# Patient Record
Sex: MALE | Race: WHITE | Employment: FULL TIME | URBAN - NONMETROPOLITAN AREA
[De-identification: names, ages, dates, MRNs, and addresses within clinical notes are randomized per-mention and may not be internally consistent; named-entity substitution may affect disease eponyms.]

---
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
## 2020-01-01 ENCOUNTER — HOSPITAL ENCOUNTER (INPATIENT)
Age: 20
LOS: 1 days | Discharge: HOME | DRG: 153 | End: 2020-01-02
Attending: FAMILY MEDICINE | Admitting: INTERNAL MEDICINE
Payer: COMMERCIAL

## 2020-01-01 ENCOUNTER — HOSPITAL ENCOUNTER (EMERGENCY)
Age: 20
Discharge: ANOTHER HOSPITAL | End: 2020-01-01
Attending: EMERGENCY MEDICINE
Payer: COMMERCIAL

## 2020-01-01 ENCOUNTER — APPOINTMENT (OUTPATIENT)
Dept: CT IMAGING | Age: 20
End: 2020-01-01
Payer: COMMERCIAL

## 2020-01-01 VITALS
OXYGEN SATURATION: 99 % | TEMPERATURE: 98.7 F | WEIGHT: 160 LBS | HEART RATE: 115 BPM | RESPIRATION RATE: 18 BRPM | SYSTOLIC BLOOD PRESSURE: 151 MMHG | DIASTOLIC BLOOD PRESSURE: 95 MMHG

## 2020-01-01 PROBLEM — J36 PERITONSILLAR ABSCESS: Status: ACTIVE | Noted: 2020-01-01

## 2020-01-01 LAB
ABSOLUTE BANDS #: 0.11 K/UL
ABSOLUTE EOS #: 0.11 K/UL (ref 0–0.4)
ABSOLUTE IMMATURE GRANULOCYTE: 0 K/UL (ref 0–0.3)
ABSOLUTE LYMPH #: 2.22 K/UL (ref 1.2–5.2)
ABSOLUTE MONO #: 0.89 K/UL (ref 0.1–1.4)
ANION GAP SERPL CALCULATED.3IONS-SCNC: 16 MMOL/L (ref 9–17)
ATYPICAL LYMPHOCYTE ABSOLUTE COUNT: 0.22 K/UL
ATYPICAL LYMPHOCYTES: 2 %
BANDS: 1 %
BASOPHILS # BLD: 0 % (ref 0–2)
BASOPHILS ABSOLUTE: 0 K/UL (ref 0–0.2)
BUN BLDV-MCNC: 9 MG/DL (ref 6–20)
BUN/CREAT BLD: 13 (ref 9–20)
CALCIUM SERPL-MCNC: 9.9 MG/DL (ref 8.6–10.4)
CHLORIDE BLD-SCNC: 96 MMOL/L (ref 98–107)
CO2: 25 MMOL/L (ref 20–31)
CREAT SERPL-MCNC: 0.72 MG/DL (ref 0.7–1.2)
DIFFERENTIAL TYPE: ABNORMAL
EOSINOPHILS RELATIVE PERCENT: 1 % (ref 1–8)
GFR AFRICAN AMERICAN: ABNORMAL ML/MIN
GFR NON-AFRICAN AMERICAN: ABNORMAL ML/MIN
GFR SERPL CREATININE-BSD FRML MDRD: ABNORMAL ML/MIN/{1.73_M2}
GFR SERPL CREATININE-BSD FRML MDRD: ABNORMAL ML/MIN/{1.73_M2}
GLUCOSE BLD-MCNC: 126 MG/DL (ref 70–99)
HCT VFR BLD CALC: 48.6 % (ref 40.7–50.3)
HEMOGLOBIN: 17 G/DL (ref 13–17)
IMMATURE GRANULOCYTES: 0 %
LYMPHOCYTES # BLD: 20 % (ref 15–43)
MCH RBC QN AUTO: 29.8 PG (ref 25.2–33.5)
MCHC RBC AUTO-ENTMCNC: 35 G/DL (ref 25.2–33.5)
MCV RBC AUTO: 85.1 FL (ref 82.6–102.9)
MONOCYTES # BLD: 8 % (ref 6–14)
MONONUCLEOSIS SCREEN: NEGATIVE
MORPHOLOGY: ABNORMAL
MORPHOLOGY: ABNORMAL
NRBC AUTOMATED: 0 PER 100 WBC
PDW BLD-RTO: 11.9 % (ref 11.8–14.4)
PLATELET # BLD: 343 K/UL (ref 138–453)
PLATELET ESTIMATE: ABNORMAL
PMV BLD AUTO: 10.5 FL (ref 8.1–13.5)
POC STREP A ANTIGEN: NEGATIVE
POTASSIUM SERPL-SCNC: 3.7 MMOL/L (ref 3.7–5.3)
RBC # BLD: 5.71 M/UL (ref 4.21–5.77)
RBC # BLD: ABNORMAL 10*6/UL
SEG NEUTROPHILS: 68 % (ref 44–74)
SEGMENTED NEUTROPHILS ABSOLUTE COUNT: 7.55 K/UL (ref 1.8–8)
SODIUM BLD-SCNC: 137 MMOL/L (ref 135–144)
WBC # BLD: 11.1 K/UL (ref 4.5–13.5)
WBC # BLD: ABNORMAL 10*3/UL

## 2020-01-01 PROCEDURE — 96375 TX/PRO/DX INJ NEW DRUG ADDON: CPT

## 2020-01-01 PROCEDURE — 6360000004 HC RX CONTRAST MEDICATION: Performed by: EMERGENCY MEDICINE

## 2020-01-01 PROCEDURE — 6360000002 HC RX W HCPCS: Performed by: EMERGENCY MEDICINE

## 2020-01-01 PROCEDURE — 96365 THER/PROPH/DIAG IV INF INIT: CPT

## 2020-01-01 PROCEDURE — 1200000000 HC SEMI PRIVATE

## 2020-01-01 PROCEDURE — 2580000003 HC RX 258: Performed by: PHYSICIAN ASSISTANT

## 2020-01-01 PROCEDURE — 2709999900 HC NON-CHARGEABLE SUPPLY

## 2020-01-01 PROCEDURE — 80048 BASIC METABOLIC PNL TOTAL CA: CPT

## 2020-01-01 PROCEDURE — 87651 STREP A DNA AMP PROBE: CPT

## 2020-01-01 PROCEDURE — 99284 EMERGENCY DEPT VISIT MOD MDM: CPT

## 2020-01-01 PROCEDURE — 99223 1ST HOSP IP/OBS HIGH 75: CPT | Performed by: PHYSICIAN ASSISTANT

## 2020-01-01 PROCEDURE — 85025 COMPLETE CBC W/AUTO DIFF WBC: CPT

## 2020-01-01 PROCEDURE — 2709999900 CT SOFT TISSUE NECK W CONTRAST

## 2020-01-01 PROCEDURE — 86308 HETEROPHILE ANTIBODY SCREEN: CPT

## 2020-01-01 PROCEDURE — 36415 COLL VENOUS BLD VENIPUNCTURE: CPT

## 2020-01-01 PROCEDURE — 2500000003 HC RX 250 WO HCPCS: Performed by: EMERGENCY MEDICINE

## 2020-01-01 RX ORDER — POLYETHYLENE GLYCOL 3350 17 G/17G
17 POWDER, FOR SOLUTION ORAL DAILY PRN
Status: DISCONTINUED | OUTPATIENT
Start: 2020-01-01 | End: 2020-01-02 | Stop reason: HOSPADM

## 2020-01-01 RX ORDER — POTASSIUM CHLORIDE 7.45 MG/ML
10 INJECTION INTRAVENOUS PRN
Status: DISCONTINUED | OUTPATIENT
Start: 2020-01-01 | End: 2020-01-02 | Stop reason: HOSPADM

## 2020-01-01 RX ORDER — SODIUM CHLORIDE 0.9 % (FLUSH) 0.9 %
10 SYRINGE (ML) INJECTION EVERY 12 HOURS SCHEDULED
Status: DISCONTINUED | OUTPATIENT
Start: 2020-01-01 | End: 2020-01-02 | Stop reason: HOSPADM

## 2020-01-01 RX ORDER — ONDANSETRON 2 MG/ML
4 INJECTION INTRAMUSCULAR; INTRAVENOUS EVERY 6 HOURS PRN
Status: DISCONTINUED | OUTPATIENT
Start: 2020-01-01 | End: 2020-01-02 | Stop reason: HOSPADM

## 2020-01-01 RX ORDER — CLINDAMYCIN PHOSPHATE 600 MG/50ML
600 INJECTION INTRAVENOUS EVERY 8 HOURS
Status: DISCONTINUED | OUTPATIENT
Start: 2020-01-02 | End: 2020-01-02 | Stop reason: ALTCHOICE

## 2020-01-01 RX ORDER — SODIUM CHLORIDE 9 MG/ML
INJECTION, SOLUTION INTRAVENOUS CONTINUOUS
Status: DISCONTINUED | OUTPATIENT
Start: 2020-01-01 | End: 2020-01-02 | Stop reason: HOSPADM

## 2020-01-01 RX ORDER — SODIUM CHLORIDE 0.9 % (FLUSH) 0.9 %
10 SYRINGE (ML) INJECTION PRN
Status: DISCONTINUED | OUTPATIENT
Start: 2020-01-01 | End: 2020-01-02 | Stop reason: HOSPADM

## 2020-01-01 RX ORDER — DEXAMETHASONE SODIUM PHOSPHATE 4 MG/ML
4 INJECTION, SOLUTION INTRA-ARTICULAR; INTRALESIONAL; INTRAMUSCULAR; INTRAVENOUS; SOFT TISSUE EVERY 12 HOURS
Status: DISCONTINUED | OUTPATIENT
Start: 2020-01-02 | End: 2020-01-02 | Stop reason: ALTCHOICE

## 2020-01-01 RX ORDER — POTASSIUM CHLORIDE 20 MEQ/1
40 TABLET, EXTENDED RELEASE ORAL PRN
Status: DISCONTINUED | OUTPATIENT
Start: 2020-01-01 | End: 2020-01-02 | Stop reason: HOSPADM

## 2020-01-01 RX ORDER — DEXAMETHASONE SODIUM PHOSPHATE 10 MG/ML
10 INJECTION INTRAMUSCULAR; INTRAVENOUS ONCE
Status: COMPLETED | OUTPATIENT
Start: 2020-01-01 | End: 2020-01-01

## 2020-01-01 RX ORDER — ACETAMINOPHEN 325 MG/1
650 TABLET ORAL EVERY 4 HOURS PRN
Status: DISCONTINUED | OUTPATIENT
Start: 2020-01-01 | End: 2020-01-02 | Stop reason: HOSPADM

## 2020-01-01 RX ORDER — CLINDAMYCIN PHOSPHATE 900 MG/50ML
900 INJECTION INTRAVENOUS ONCE
Status: COMPLETED | OUTPATIENT
Start: 2020-01-01 | End: 2020-01-01

## 2020-01-01 RX ADMIN — SODIUM CHLORIDE: 9 INJECTION, SOLUTION INTRAVENOUS at 21:53

## 2020-01-01 RX ADMIN — Medication 10 ML: at 21:53

## 2020-01-01 RX ADMIN — DEXAMETHASONE SODIUM PHOSPHATE 10 MG: 10 INJECTION INTRAMUSCULAR; INTRAVENOUS at 16:43

## 2020-01-01 RX ADMIN — CLINDAMYCIN PHOSPHATE 900 MG: 900 INJECTION, SOLUTION INTRAVENOUS at 16:47

## 2020-01-01 RX ADMIN — IOPAMIDOL 75 ML: 755 INJECTION, SOLUTION INTRAVENOUS at 16:03

## 2020-01-01 ASSESSMENT — PAIN DESCRIPTION - PROGRESSION
CLINICAL_PROGRESSION: GRADUALLY WORSENING
CLINICAL_PROGRESSION: NOT CHANGED

## 2020-01-01 ASSESSMENT — PAIN DESCRIPTION - PAIN TYPE
TYPE: ACUTE PAIN
TYPE: ACUTE PAIN

## 2020-01-01 ASSESSMENT — PAIN DESCRIPTION - FREQUENCY
FREQUENCY: CONTINUOUS
FREQUENCY: INTERMITTENT

## 2020-01-01 ASSESSMENT — PAIN DESCRIPTION - LOCATION
LOCATION: THROAT
LOCATION: THROAT

## 2020-01-01 ASSESSMENT — PAIN DESCRIPTION - DESCRIPTORS: DESCRIPTORS: SORE

## 2020-01-01 ASSESSMENT — PAIN - FUNCTIONAL ASSESSMENT: PAIN_FUNCTIONAL_ASSESSMENT: PREVENTS OR INTERFERES SOME ACTIVE ACTIVITIES AND ADLS

## 2020-01-01 ASSESSMENT — PAIN DESCRIPTION - ONSET
ONSET: ON-GOING
ONSET: ON-GOING

## 2020-01-01 ASSESSMENT — PAIN SCALES - GENERAL
PAINLEVEL_OUTOF10: 8
PAINLEVEL_OUTOF10: 6

## 2020-01-01 ASSESSMENT — PAIN DESCRIPTION - ORIENTATION: ORIENTATION: RIGHT

## 2020-01-01 NOTE — ED PROVIDER NOTES
Psoriasis Father           SOCIAL HISTORY       Social History     Socioeconomic History    Marital status: Single     Spouse name: None    Number of children: None    Years of education: None    Highest education level: None   Occupational History    None   Social Needs    Financial resource strain: None    Food insecurity:     Worry: None     Inability: None    Transportation needs:     Medical: None     Non-medical: None   Tobacco Use    Smoking status: Never Smoker    Smokeless tobacco: Never Used   Substance and Sexual Activity    Alcohol use: No    Drug use: No    Sexual activity: None   Lifestyle    Physical activity:     Days per week: None     Minutes per session: None    Stress: None   Relationships    Social connections:     Talks on phone: None     Gets together: None     Attends Evangelical service: None     Active member of club or organization: None     Attends meetings of clubs or organizations: None     Relationship status: None    Intimate partner violence:     Fear of current or ex partner: None     Emotionally abused: None     Physically abused: None     Forced sexual activity: None   Other Topics Concern    None   Social History Narrative    None       SCREENINGS    Ricardo Coma Scale  Eye Opening: Spontaneous  Best Verbal Response: Oriented  Best Motor Response: Obeys commands  [place] Coma Scale Score: 15        PHYSICAL EXAM    (up to 7 for level 4, 8 or more for level 5)     ED Triage Vitals [01/01/20 1517]   BP Temp Temp src Heart Rate Resp SpO2 Height Weight   (!) 145/93 98.7 °F (37.1 °C) -- 109 14 98 % -- --       Physical Exam  Vitals signs reviewed. Constitutional:       General: He is not in acute distress. Appearance: Normal appearance. He is not ill-appearing. HENT:      Head: Normocephalic.       Right Ear: External ear normal.      Left Ear: External ear normal.      Nose: Nose normal.      Mouth/Throat:      Mouth: Mucous membranes are moist.      Comments: Patient is experiencing mild trismus. He has bilateral tonsillar enlargement with erythema, the right one greater than the left with shift of uvula to the left. No exudate is seen. Eyes:      Extraocular Movements: Extraocular movements intact. Pupils: Pupils are equal, round, and reactive to light. Neck:      Musculoskeletal: Neck supple. Cardiovascular:      Rate and Rhythm: Normal rate and regular rhythm. Pulmonary:      Effort: Pulmonary effort is normal.      Breath sounds: Normal breath sounds. Abdominal:      Palpations: Abdomen is soft. Musculoskeletal: Normal range of motion. Lymphadenopathy:      Cervical: Cervical adenopathy present. Skin:     General: Skin is warm and dry. Coloration: Skin is not pale. Findings: No rash. Neurological:      General: No focal deficit present. Mental Status: He is alert. DIAGNOSTIC RESULTS     EKG: All EKG's are interpreted by the Emergency Department Physician who either signs orCo-signs this chart in the absence of a cardiologist.    RADIOLOGY:   Non-plain film images such as CT, Ultrasound and MRI are read by the radiologist. Plain radiographic images are visualized and preliminarily interpreted by the emergency physician with the below findings:    Interpretation per the Radiologist below, ifavailable at the time of this note:    CT SOFT TISSUE NECK W CONTRAST   Final Result   Acute tonsillitis with subcentimeter right peritonsillar abscess and reactive   bilateral upper cervical adenopathy.                ED BEDSIDE ULTRASOUND:   Performed by ED Physician - none    LABS:  Labs Reviewed   CBC WITH AUTO DIFFERENTIAL - Abnormal; Notable for the following components:       Result Value    MCHC 35.0 (*)     Bands 1 (*)     All other components within normal limits   BASIC METABOLIC PANEL - Abnormal; Notable for the following components:    Glucose 126 (*)     Chloride 96 (*)     All other components within normal limits MONONUCLEOSIS SCREEN   STREP A DNA PROBE, AMPLIFICATION   POCT RAPID STREP A   POC STREP A ANTIGEN       All other labs were within normal range ornot returned as of this dictation. EMERGENCY DEPARTMENT COURSE and DIFFERENTIAL DIAGNOSIS/MDM:   Vitals:    Vitals:    01/01/20 1517 01/01/20 1546 01/01/20 1649   BP: (!) 145/93  (!) 151/95   Pulse: 109  115   Resp: 14  18   Temp: 98.7 °F (37.1 °C)     SpO2: 98%  99%   Weight:  160 lb (72.6 kg)             Patient is afebrile and has a normal white count. Clinically he has signs of a right-sided peritonsillar abscess which is confirmed by his CT scan of soft tissue of the neck showing a subcentimeter abscess. His airway is not compromised at this time. He is started on IV clindamycin and given a single dose of that drawn 10 mg IV. [facility] was contacted and their admitting nurse has accepted the patient for their hospitalist Dr. Rick Harrell. Patient is stable for transfer. MDM    CONSULTS:  None    PROCEDURES:  Unlessotherwise noted below, none     Procedures    FINAL IMPRESSION      1. Peritonsillar abscess          DISPOSITION/PLAN   DISPOSITION Decision To Transfer 01/01/2020 04:47:54 PM      PATIENT REFERRED TO:  No follow-up provider specified. DISCHARGE MEDICATIONS:         Problem List:  There is no problem list on file for this patient. Summation      Patient Course: Transferred. ED Medicationsadministered this visit:    Medications   clindamycin (CLEOCIN) 900 mg in dextrose 5 % 50 mL IVPB (900 mg Intravenous New Bag 1/1/20 1647)   iopamidol (ISOVUE-370) 76 % injection 75 mL (75 mLs Intravenous Given 1/1/20 1603)   dexamethasone (DECADRON) injection 10 mg (10 mg Intravenous Given 1/1/20 1643)       New Prescriptions from this visit:    New Prescriptions    No medications on file       Follow-up:  No follow-up provider specified. Final Impression:   1.  Peritonsillar abscess               (Please note that

## 2020-01-02 VITALS
SYSTOLIC BLOOD PRESSURE: 130 MMHG | DIASTOLIC BLOOD PRESSURE: 68 MMHG | RESPIRATION RATE: 16 BRPM | HEART RATE: 94 BPM | OXYGEN SATURATION: 97 % | TEMPERATURE: 98 F | HEIGHT: 70 IN | BODY MASS INDEX: 22.69 KG/M2 | WEIGHT: 158.5 LBS

## 2020-01-02 PROBLEM — J03.01 ACUTE RECURRENT STREPTOCOCCAL TONSILLITIS: Status: ACTIVE | Noted: 2020-01-02

## 2020-01-02 PROBLEM — D72.89 ATYPICAL LYMPHOCYTES PRESENT ON PERIPHERAL BLOOD SMEAR: Status: ACTIVE | Noted: 2020-01-02

## 2020-01-02 LAB
ANION GAP SERPL CALCULATED.3IONS-SCNC: 15 MEQ/L (ref 8–16)
ATYPICAL LYMPHOCYTES: ABNORMAL %
BASOPHILS # BLD: 0.1 %
BASOPHILS ABSOLUTE: 0 THOU/MM3 (ref 0–0.1)
BUN BLDV-MCNC: 12 MG/DL (ref 7–22)
CALCIUM SERPL-MCNC: 9.8 MG/DL (ref 8.5–10.5)
CHLORIDE BLD-SCNC: 99 MEQ/L (ref 98–111)
CO2: 24 MEQ/L (ref 23–33)
CREAT SERPL-MCNC: 0.6 MG/DL (ref 0.4–1.2)
EOSINOPHIL # BLD: 0 %
EOSINOPHILS ABSOLUTE: 0 THOU/MM3 (ref 0–0.4)
ERYTHROCYTE [DISTWIDTH] IN BLOOD BY AUTOMATED COUNT: 11.9 % (ref 11.5–14.5)
ERYTHROCYTE [DISTWIDTH] IN BLOOD BY AUTOMATED COUNT: 38.2 FL (ref 35–45)
GLUCOSE BLD-MCNC: 126 MG/DL (ref 70–108)
HCT VFR BLD CALC: 48 % (ref 42–52)
HEMOGLOBIN: 16.4 GM/DL (ref 14–18)
IMMATURE GRANS (ABS): 0.08 THOU/MM3 (ref 0–0.07)
IMMATURE GRANULOCYTES: 0.8 %
LYMPHOCYTES # BLD: 11.9 %
LYMPHOCYTES ABSOLUTE: 1.2 THOU/MM3 (ref 1–4.8)
MCH RBC QN AUTO: 29.9 PG (ref 26–33)
MCHC RBC AUTO-ENTMCNC: 34.2 GM/DL (ref 32.2–35.5)
MCV RBC AUTO: 87.6 FL (ref 80–94)
MONOCYTES # BLD: 1.7 %
MONOCYTES ABSOLUTE: 0.2 THOU/MM3 (ref 0.4–1.3)
NUCLEATED RED BLOOD CELLS: 0 /100 WBC
PLATELET # BLD: 320 THOU/MM3 (ref 130–400)
PMV BLD AUTO: 10.9 FL (ref 9.4–12.4)
POTASSIUM REFLEX MAGNESIUM: 4.5 MEQ/L (ref 3.5–5.2)
RBC # BLD: 5.48 MILL/MM3 (ref 4.7–6.1)
SCAN OF BLOOD SMEAR: NORMAL
SEG NEUTROPHILS: 85.5 %
SEGMENTED NEUTROPHILS ABSOLUTE COUNT: 8.8 THOU/MM3 (ref 1.8–7.7)
SODIUM BLD-SCNC: 138 MEQ/L (ref 135–145)
WBC # BLD: 10.3 THOU/MM3 (ref 4.8–10.8)

## 2020-01-02 PROCEDURE — 2500000003 HC RX 250 WO HCPCS: Performed by: PHYSICIAN ASSISTANT

## 2020-01-02 PROCEDURE — 80048 BASIC METABOLIC PNL TOTAL CA: CPT

## 2020-01-02 PROCEDURE — 94761 N-INVAS EAR/PLS OXIMETRY MLT: CPT

## 2020-01-02 PROCEDURE — 2709999900 HC NON-CHARGEABLE SUPPLY

## 2020-01-02 PROCEDURE — 6360000002 HC RX W HCPCS: Performed by: OTOLARYNGOLOGY

## 2020-01-02 PROCEDURE — 2580000003 HC RX 258: Performed by: OTOLARYNGOLOGY

## 2020-01-02 PROCEDURE — 36415 COLL VENOUS BLD VENIPUNCTURE: CPT

## 2020-01-02 PROCEDURE — 85025 COMPLETE CBC W/AUTO DIFF WBC: CPT

## 2020-01-02 PROCEDURE — 99252 IP/OBS CONSLTJ NEW/EST SF 35: CPT | Performed by: PHYSICIAN ASSISTANT

## 2020-01-02 PROCEDURE — 99238 HOSP IP/OBS DSCHRG MGMT 30/<: CPT | Performed by: NURSE PRACTITIONER

## 2020-01-02 RX ORDER — DEXAMETHASONE SODIUM PHOSPHATE 4 MG/ML
8 INJECTION, SOLUTION INTRA-ARTICULAR; INTRALESIONAL; INTRAMUSCULAR; INTRAVENOUS; SOFT TISSUE EVERY 12 HOURS
Status: DISCONTINUED | OUTPATIENT
Start: 2020-01-02 | End: 2020-01-02 | Stop reason: HOSPADM

## 2020-01-02 RX ORDER — PREDNISONE 20 MG/1
40 TABLET ORAL DAILY
Qty: 10 TABLET | Refills: 0 | Status: SHIPPED | OUTPATIENT
Start: 2020-01-02 | End: 2020-01-07

## 2020-01-02 RX ORDER — AMOXICILLIN AND CLAVULANATE POTASSIUM 875; 125 MG/1; MG/1
1 TABLET, FILM COATED ORAL 2 TIMES DAILY
Qty: 28 TABLET | Refills: 0 | Status: SHIPPED | OUTPATIENT
Start: 2020-01-02 | End: 2020-01-16

## 2020-01-02 RX ORDER — DEXAMETHASONE SODIUM PHOSPHATE 4 MG/ML
4 INJECTION, SOLUTION INTRA-ARTICULAR; INTRALESIONAL; INTRAMUSCULAR; INTRAVENOUS; SOFT TISSUE EVERY 12 HOURS
Status: DISCONTINUED | OUTPATIENT
Start: 2020-01-02 | End: 2020-01-02

## 2020-01-02 RX ADMIN — AMPICILLIN SODIUM AND SULBACTAM SODIUM 3 G: 2; 1 INJECTION, POWDER, FOR SOLUTION INTRAMUSCULAR; INTRAVENOUS at 04:31

## 2020-01-02 RX ADMIN — CLINDAMYCIN PHOSPHATE 600 MG: 600 INJECTION, SOLUTION INTRAVENOUS at 01:44

## 2020-01-02 RX ADMIN — AMPICILLIN SODIUM AND SULBACTAM SODIUM 3 G: 2; 1 INJECTION, POWDER, FOR SOLUTION INTRAMUSCULAR; INTRAVENOUS at 10:14

## 2020-01-02 RX ADMIN — DEXAMETHASONE SODIUM PHOSPHATE 8 MG: 4 INJECTION, SOLUTION INTRAMUSCULAR; INTRAVENOUS at 06:26

## 2020-01-02 ASSESSMENT — PAIN SCALES - GENERAL: PAINLEVEL_OUTOF10: 2

## 2020-01-02 ASSESSMENT — PAIN DESCRIPTION - PROGRESSION
CLINICAL_PROGRESSION: NOT CHANGED

## 2020-01-02 ASSESSMENT — ENCOUNTER SYMPTOMS
ALLERGIC/IMMUNOLOGIC NEGATIVE: 1
SORE THROAT: 1
RESPIRATORY NEGATIVE: 1
GASTROINTESTINAL NEGATIVE: 1
EYES NEGATIVE: 1

## 2020-01-02 ASSESSMENT — PAIN DESCRIPTION - DESCRIPTORS: DESCRIPTORS: SORE

## 2020-01-02 NOTE — PLAN OF CARE
Problem: Pain:  Goal: Pain level will decrease  Description  Pain level will decrease  Outcome: Ongoing  Note:   Patient reporting pain 3/10 with goal of 4/10. Patient satisfied with current interventions including rest and repositioning. Pain assessments ongoing. Problem: Falls - Risk of:  Goal: Will remain free from falls  Description  Will remain free from falls  Outcome: Ongoing  Note:   Patient free from falls this shift. Patient using call light appropriately. Call light in reach, fall sign posted, nonskid footwear on, hourly rounding, bed alarm on, bed rails up x2. Patient at risk for falls due to generalized weakness. Problem: Physical Regulation:  Goal: Will remain free from infection  Description  Will remain free from infection  Outcome: Ongoing  Note:   WBC 11.1. Patient afebrile and denies chills. Patient on IV abx. Problem: Physical Regulation:  Goal: Ability to maintain vital signs within normal range will improve  Description  Ability to maintain vital signs within normal range will improve  Outcome: Ongoing  Note:   VSS. Problem: Discharge Planning:  Goal: Discharged to appropriate level of care  Description  Discharged to appropriate level of care  Outcome: Ongoing  Note:   Discharge plans to home with dad discussed with patient. Care plan reviewed with patient. Patient verbalizes understanding of the plan of care and contributes to goal setting.

## 2020-01-02 NOTE — DISCHARGE SUMMARY
dexamethasone was continued. ENT was consulted for their advice and they found the patient to not have a peritonsillar abscess but rather patchy hypodense areas bilaterally with nothing to drain. At the time of discharge, the patient appears well and reports feeling much better than when he initially presented. He states that the swelling in his throat has gone down and he is able to swallow now. The patient successfully ate a mechanical soft diet for breakfast, was alert and oriented to person, place, time, and situation. He denied any chest pain, shortness of breath, nausea, vomiting, diarrhea, constipation, lightheadedness, dizziness, or any other physical complaints. He was updated on the plan of care, instructed to take the antibiotics and steroids as prescribed throughout the duration of the prescription, and to follow-up with his primary care physician in 1 week. The patient had no other needs or questions at this time. Physical Exam:-  Vitals:   Patient Vitals for the past 24 hrs:   BP Temp Temp src Pulse Resp SpO2 Height Weight   01/02/20 0910 -- -- -- -- -- 97 % -- --   01/02/20 0845 130/68 98 °F (36.7 °C) Oral 94 16 97 % -- --   01/02/20 0430 118/61 98 °F (36.7 °C) Oral 100 16 97 % -- --   01/02/20 0030 121/69 98.4 °F (36.9 °C) Oral 107 16 97 % -- --   01/01/20 2138 131/76 99.5 °F (37.5 °C) Oral 122 16 98 % 5' 10\" (1.778 m) 158 lb 8 oz (71.9 kg)     Weight:   Weight: 158 lb 8 oz (71.9 kg)   24 hour intake/output:     Intake/Output Summary (Last 24 hours) at 1/2/2020 1403  Last data filed at 1/2/2020 0857  Gross per 24 hour   Intake 1226 ml   Output 0 ml   Net 1226 ml       1. General appearance: No apparent distress, appears stated age and cooperative. 2. HEENT: Normal cephalic, atraumatic without obvious deformity. Pupils equal, round, and reactive to light. Extra ocular muscles intact. Conjunctivae/corneas clear.   Mild erythema noted to the anterior tonsillar pillar and posterior pharynx. 3. Neck: Supple, with full range of motion. No jugular venous distention. Trachea midline. 4. Respiratory:  Normal respiratory effort. Clear to auscultation, bilaterally without Rales/Wheezes/Rhonchi. 5. Cardiovascular: Regular rate and rhythm with normal S1/S2 without murmurs, rubs or gallops. 6. Abdomen: Soft, non-tender, non-distended with normal bowel sounds. 7. Musculoskeletal:  No clubbing, cyanosis or edema bilaterally. 8. Skin: Skin color, texture, turgor normal.  No rashes or lesions. 9. Neurologic:  Neurovascularly intact without any focal sensory/motor deficits. Cranial nerves: II-XII intact, grossly non-focal.  10. Psychiatric: Alert and oriented, thought content appropriate, normal insight  11. Capillary Refill: Brisk,< 3 seconds   12. Peripheral Pulses: +2 palpable, equal bilaterally    Discharge Medications:-      Medication List      You have not been prescribed any medications.           Labs :-  Recent Results (from the past 72 hour(s))   CBC Auto Differential    Collection Time: 01/01/20  3:37 PM   Result Value Ref Range    WBC 11.1 4.5 - 13.5 k/uL    RBC 5.71 4.21 - 5.77 m/uL    Hemoglobin 17.0 13.0 - 17.0 g/dL    Hematocrit 48.6 40.7 - 50.3 %    MCV 85.1 82.6 - 102.9 fL    MCH 29.8 25.2 - 33.5 pg    MCHC 35.0 (H) 25.2 - 33.5 g/dL    RDW 11.9 11.8 - 14.4 %    Platelets 763 677 - 048 k/uL    MPV 10.5 8.1 - 13.5 fL    NRBC Automated 0.0 0.0 per 100 WBC    Differential Type NOT REPORTED     WBC Morphology NOT REPORTED     RBC Morphology NOT REPORTED     Platelet Estimate NOT REPORTED     Monocytes 8 6 - 14 %    Lymphocytes 20 15 - 43 %    Seg Neutrophils 68 44 - 74 %    Eosinophils % 1 1 - 8 %    Basophils 0 0 - 2 %    Immature Granulocytes 0 0 %    Atypical Lymphocytes 2 %    Bands 1 (H) 0 %    Absolute Mono # 0.89 0.1 - 1.4 k/uL    Absolute Lymph # 2.22 1.2 - 5.2 k/uL    Segs Absolute 7.55 1.8 - 8.0 k/uL    Absolute Eos # 0.11 0.0 - 0.4 k/uL    Basophils Absolute 0.00 0.0 - 0.2 k/uL APRN - CNP on 1/2/2020 at 2:03 PM  Discharging Hospitalist

## 2020-01-02 NOTE — CARE COORDINATION
1/2/20, 10:54 AM  DISCHARGE PLANNING EVALUATION:    Ashleigh Madera       Admitted from: ER, patient presented with a sore throat, recently treated for Strep throat. 1/1/2020/ 220 Adrianna Diaz day: 1   Location: [facility]/016-A Reason for admit: Peritonsillar abscess [J36] Status: Inpt. Admit order signed?: yes  PMH:  has a past medical history of Eczema. Procedure: N/A  Pertinent abnormal Imaging:CT of neck soft tissue:   Acute tonsillitis with subcentimeter right peritonsillar abscess and reactive   bilateral upper cervical adenopathy. Medications:  Scheduled Meds:   dexamethasone  8 mg Intravenous Q12H    ampicillin-sulbactam  3 g Intravenous Q8H    sodium chloride flush  10 mL Intravenous 2 times per day     Continuous Infusions:   sodium chloride 75 mL/hr at 01/01/20 9703      Pertinent Info/Orders/Treatment Plan:  ENT consult, IV fluids, Unasyn, Dexamethasone, prn  Tylenol, Potassium and Magnesium replacement protocol, SCD's, up with assistance. Diet: DIET CLEAR LIQUID;   Smoking status:  reports that he has never smoked. He has never used smokeless tobacco.   PCP: Tiburcio Bhatia MD  Readmission 30 days or less: No  Readmission Risk Score: 6%    Discharge Planning Evaluation  Current Residence:  Private Residence  Living Arrangements:  Parent   Support Systems:  Parent, Family Members  Current Services PTA:     Potential Assistance Needed:  N/A  Potential Assistance Purchasing Medications:  No  Does patient want to participate in local refill/ meds to beds program?  No  Type of Home Care Services:  None  Patient expects to be discharged to:  home  Expected Discharge date:  01/06/20  Follow Up Appointment: Best Day/ Time: Monday AM    Patient Goals/Plan/Treatment Preferences: Met with Alina Campbell. He resides at home with his parents. He has insurance, a PCP, can afford his medications, will have transportation to home at discharge, and his nutritional needs and ADL's are met.  Alina Campbell denies a need for services or DME at discharge. Transportation/Food Security/Housekeeping Addressed:  No issues identified.     Evaluation: no

## 2020-01-02 NOTE — H&P
club or organization: Not on file     Attends meetings of clubs or organizations: Not on file     Relationship status: Not on file    Intimate partner violence:     Fear of current or ex partner: Not on file     Emotionally abused: Not on file     Physically abused: Not on file     Forced sexual activity: Not on file   Other Topics Concern    Not on file   Social History Narrative    Not on file       FHX:   Family History   Problem Relation Age of Onset    Psoriasis Father        Allergies: No Known Allergies    Medications:     sodium chloride        sodium chloride flush  10 mL Intravenous 2 times per day     sodium chloride flush, 10 mL, PRN  ondansetron, 4 mg, Q6H PRN  potassium chloride, 40 mEq, PRN    Or  potassium alternative oral replacement, 40 mEq, PRN    Or  potassium chloride, 10 mEq, PRN  magnesium sulfate, 1 g, PRN  polyethylene glycol, 17 g, Daily PRN  acetaminophen, 650 mg, Q4H PRN      No current facility-administered medications on file prior to encounter. No current outpatient medications on file prior to encounter.          Labs:   Recent Results (from the past 24 hour(s))   CBC Auto Differential    Collection Time: 01/01/20  3:37 PM   Result Value Ref Range    WBC 11.1 4.5 - 13.5 k/uL    RBC 5.71 4.21 - 5.77 m/uL    Hemoglobin 17.0 13.0 - 17.0 g/dL    Hematocrit 48.6 40.7 - 50.3 %    MCV 85.1 82.6 - 102.9 fL    MCH 29.8 25.2 - 33.5 pg    MCHC 35.0 (H) 25.2 - 33.5 g/dL    RDW 11.9 11.8 - 14.4 %    Platelets 907 484 - 966 k/uL    MPV 10.5 8.1 - 13.5 fL    NRBC Automated 0.0 0.0 per 100 WBC    Differential Type NOT REPORTED     WBC Morphology NOT REPORTED     RBC Morphology NOT REPORTED     Platelet Estimate NOT REPORTED     Monocytes 8 6 - 14 %    Lymphocytes 20 15 - 43 %    Seg Neutrophils 68 44 - 74 %    Eosinophils % 1 1 - 8 %    Basophils 0 0 - 2 %    Immature Granulocytes 0 0 %    Atypical Lymphocytes 2 %    Bands 1 (H) 0 %    Absolute Mono # 0.89 0.1 - 1.4 k/uL    Absolute Lymph # 2. 22 1.2 - 5.2 k/uL    Segs Absolute 7.55 1.8 - 8.0 k/uL    Absolute Eos # 0.11 0.0 - 0.4 k/uL    Basophils Absolute 0.00 0.0 - 0.2 k/uL    Absolute Immature Granulocyte 0.00 0.00 - 0.30 k/uL    Atypical Lymphocytes Absolute 0.22 k/uL    Absolute Bands # 0.11 k/uL    Morphology RBC morphology normal.     Morphology Platelet count adequate    Basic Metabolic Prof    Collection Time: 01/01/20  3:37 PM   Result Value Ref Range    Glucose 126 (H) 70 - 99 mg/dL    BUN 9 6 - 20 mg/dL    CREATININE 0.72 0.70 - 1.20 mg/dL    Bun/Cre Ratio 13 9 - 20    Calcium 9.9 8.6 - 10.4 mg/dL    Sodium 137 135 - 144 mmol/L    Potassium 3.7 3.7 - 5.3 mmol/L    Chloride 96 (L) 98 - 107 mmol/L    CO2 25 20 - 31 mmol/L    Anion Gap 16 9 - 17 mmol/L    GFR Non-African American  >60 mL/min     Pediatric GFR requires additional information. Refer to [facility] website for calculator. GFR  NOT REPORTED >60 mL/min    GFR Comment          GFR Staging NOT REPORTED    Mononucleosis Screen    Collection Time: 01/01/20  3:37 PM   Result Value Ref Range    Mononucleosis Screen NEGATIVE NEGATIVE   POC Strep A Antigen    Collection Time: 01/01/20  4:31 PM   Result Value Ref Range    POC Strep A Antigen NEGATIVE NEGATIVE         Vital Signs: T: 99.5F P: 122 RR: 16 B/P: 131/76: FiO2: RA: O2 Sat:98%: I/O: No intake or output data in the 24 hours ending 01/02/20 0039      General:   Well appearing, no acute distress  HEENT:  normocephalic and atraumatic. No scleral icterus. PEARLA, mucous membranes moist, tolerating secreation  Neck: supple. Trachea midline. No JVD. Full ROM, no meningismus. Lungs: clear to auscultation. No retractions, no accessory muscle use. Cardiac:  Tachycardic but regular rhythm, no murmur, 2+ pulses  Abdomen: soft. Nontender. Bowel sounds active  Extremities:  No clubbing, cyanosis x 4, no edema    Vasculature: capillary refill < 3 seconds. Skin:  warm and dry. no visible rashes  Psych:  Alert and oriented x3. Affect appropriate  Lymph:  No supraclavicular adenopathy. Neurologic:  CN II-XII grossly intact. No focal deficit. Data: (All radiographs, tracings, PFTs, and imaging are personally viewed and interpreted unless otherwise noted).     Outside data reviewed        Electronically signed by  Leonel Phoenix PA-C

## 2020-01-02 NOTE — PROGRESS NOTES
Pt admitted to  5K16 from home from direct admit: [facility]. Complaints: sore throat. INT antecubital right, condition patent and no redness and left, condition patent and no redness  IV site free of s/s of infection or infiltration. Vital signs obtained. Assessment and data collection initiated. Two nurse skin assessment performed by Konnie Ormond RN and Paresh Page RN. Oriented to room. Policies and procedures for 5 explained. All questions answered with no further questions at this time. Fall prevention and safety brochure discussed with patient. Bed alarm on. Call light in reach. Oriented to room.    Mony Hobson RN 1/1/2020 9:49 PM

## 2020-01-02 NOTE — CONSULTS
Department of Otolaryngology  Consult Note    Reason for Consult:  Sore throat, possible peritonsillar abscess  Requesting Physician:  Isiah Gallardo PA-C    CHIEF COMPLAINT:  Sore throat    History Obtained From:  patient, electronic medical record    HISTORY OF PRESENT ILLNESS:                The patient is a 23 y.o. male who presented to [facility] as a direct transfer from [facility]. The patient reports that about 2 weeks ago he had a sore throat and was diagnosed with Strep. He was sent home with Amoxicillin which improved the pain. About 2-3 days ago the pain started again and slowly worsened. He went to [facility] ED and was evaluated. The patient was having difficulty opening his mouth at that time. They did a CT which revealed a \"subcentimeter right peritonsillar abscess. \" He was transferred to [facility] for further management in case the abscess required surgical intervention. Presently, the patient reports he feels significantly better. He feels like he can swallow solids and liquids normally now. He states that last few days he hasn't felt like eating at all and now he is becoming hungry. He states that pain has also improved. He denies fevers, chills, shortness of breath, trismus, drooling. The patient was afebrile overnight. No other concerns at this time. Past Medical History:        Diagnosis Date    Eczema        Past Surgical History:    History reviewed. No pertinent surgical history.     Current Medications:   Current Facility-Administered Medications: Dexamethasone Sodium Phosphate injection 8 mg, 8 mg, Intravenous, Q12H  ampicillin-sulbactam (UNASYN) 3 g ivpb minibag, 3 g, Intravenous, Q8H  sodium chloride flush 0.9 % injection 10 mL, 10 mL, Intravenous, 2 times per day  sodium chloride flush 0.9 % injection 10 mL, 10 mL, Intravenous, PRN  ondansetron (ZOFRAN) injection 4 mg, 4 mg, Intravenous, Q6H PRN  0.9 % sodium chloride infusion, , Intravenous, Continuous  potassium chloride (KLOR-CON M) External nose: Appears midline. No obvious deformity or masses. Septum:  deviated. No septal hematoma. No perforation. Mucosa:  inflamed  Turbinates: red and congested            Discharge:  none    Mouth/Throat:  Lips, tongue and oral cavity: Normal. No masses or lesions noted. Bilateral angles of the mandible are palpable. Dentition: good, no malocclusion  Oral mucosa: moist  Tonsils: Tonsils mildly erythematous. Asymmetrical with Right >Left, but both tonsils are 2+. Non-obstructive appearing. No exudates noted. Oropharynx: normal-appearing mucosa  Hard and soft palates: symmetrical and intact. Salivary glands: not enlarged and no tenderness to palpation. Uvula: Midline, no deviation present. Gag reflex is present    Neck: Trachea midline. Thyroid not enlarged, no palpable masses or tenderness. Lymphatic: Bilateral cervical nodes palpable with R slightly worse than left. Eyes: KERRY, EOM intact. Conjunctiva moist without discharge. Lungs: Normal effort of breathing, not obviously distressed. Neuro: Cranial nerves II-XII grossly intact. Extremities: No clubbing, edema, or cyanosis noted.     DATA:    Component Value Ref Range & Units Status Collected Lab   WBC 10.3  4.8 - 10.8 thou/mm3 Final 01/02/2020  7:25 AM [facility]   RBC 5.48  4.70 - 6.10 mill/mm3 Final 01/02/2020  7:25 AM [facility]   Hemoglobin 16.4  14.0 - 18.0 gm/dl Final 01/02/2020  7:25 AM [facility]   Hematocrit 48.0  42.0 - 52.0 % Final 01/02/2020  7:25 AM [facility]   MCV 87.6  80.0 - 94.0 fL Final 01/02/2020  7:25 AM [facility]   MCH 29.9  26.0 - 33.0 pg Final 01/02/2020  7:25 AM [facility]   MCHC 34.2  32.2 - 35.5 gm/dl Final 01/02/2020  7:25 AM [facility] - [facility]   RDW-CV 11.9  11.5 - 14.5 % Final 01/02/2020  7:25 AM [facility]   RDW-SD 38.2  35.0 - 45.0 fL Final 01/02/2020  7:25 AM [facility] - [facility]   Platelets 519  973 - 947 thou/mm3 Final 01/02/2020  7:25 AM  [facility]   MPV 10.9  9.4 - 12.4 fL Final 01/02/2020  7:25 AM [facility]   Seg Neutrophils 85.5  % Final 01/02/2020  7:25 AM [facility]   Lymphocytes 11.9  % Final 01/02/2020  7:25 AM  [facility]   Monocytes 1.7  % Final 01/02/2020  7:25 AM  - [facility]   Eosinophils 0.0  % Final 01/02/2020  7:25 AM [facility]   Basophils 0.1  % Final 01/02/2020  7:25 AM  [facility]   Immature Granulocytes 0.8  % Final 01/02/2020  7:25 AM  [facility]   Atypical Lymphocytes RARE  % Final 01/02/2020  7:25 AM  [facility]   Segs Absolute 8. 8High   1.8 - 7.7 thou/mm3 Final 01/02/2020  7:25 AM  [facility]   Lymphocytes Absolute 1.2  1.0 - 4.8 thou/mm3 Final 01/02/2020  7:25 AM  [facility]   Monocytes Absolute 0.2Low   0.4 - 1.3 thou/mm3 Final 01/02/2020  7:25 AM  [facility]   Eosinophils Absolute 0.0  0.0 - 0.4 thou/mm3 Final 01/02/2020  7:25 AM  - [facility]   Basophils Absolute 0.0  0.0 - 0.1 thou/mm3 Final 01/02/2020  7:25 AM  - [facility]   Immature Grans (Abs) 0. 08High   0.00 - 0.07 thou/mm3 Final 01/02/2020  7:25 AM  - [facility]   nRBC 0  /100 wbc Final 01/02/2020  7:25 AM  - [facility] [address] [facility]         Radiology Review:  CT Soft tissue Neck W Contrast 1/1/19     FINDINGS:   PHARYNX/LARYNX:  The palatine tonsils are enlarged, right greater than left. There is focal subcentimeter right peritonsillar rim enhancing hypodensity.    The tongue is normal in appearance.  The valleculae, epiglottis,   aryepiglottic folds and pyriform sinuses appear unremarkable.  The true and   false vocal cords are normal in appearance.  No mass is seen.       SALIVARY GLANDS/THYROID:  The parotid and submandibular glands are   unremarkable.  There are incidental sub 5 mm bilateral thyroid nodules, which   are likely benign and for which no lymphadenopathy  -likely secondary to #1  -Will monitor for resolution at outpatient follow up appointment.      Electronically signed by KIKI Marquez on 1/2/2020 at 1:47 PM

## 2020-01-02 NOTE — PROGRESS NOTES
All discharge instructions given to patient and family with no further questions at this time. Patient discharged off unit via wheelchair. Chart contents placed in yellow bin.    Electronically signed by Rod Jackson RN on 1/2/2020 at 4:53 PM

## 2020-01-03 LAB
DIRECT EXAM: NORMAL
Lab: NORMAL
SPECIMEN DESCRIPTION: NORMAL

## 2020-01-03 NOTE — CARE COORDINATION
Late entry, patient discharged to home last evening with no services added/requested. 1/3/20, 7:13 AM    Patient goals/plan/ treatment preferences discussed by  and . Patient goals/plan/ treatment preferences reviewed with patient/ family. Patient/ family verbalize understanding of discharge plan and are in agreement with goal/plan/treatment preferences. Understanding was demonstrated using the teach back method. AVS provided by RN at time of discharge, which includes all necessary medical information pertaining to the patients current course of illness, treatment, post-discharge goals of care, and treatment preferences.

## 2020-01-10 ENCOUNTER — NURSE TRIAGE (OUTPATIENT)
Dept: OTHER | Facility: CLINIC | Age: 20
End: 2020-01-10

## 2020-01-11 NOTE — TELEPHONE ENCOUNTER
Patient is calling because he started antibiotics last week and he is having loose stool approx 5-7 bowel movements a day. Patient stated he is drinking plenty of fluids. In addition, his buttocks is bleeding when he wipes. Denies any blood in stool. The blood is on toilet tissue when he wipes. Please do not respond to the triage nurse through this encounter. Any subsequent communication should be directly with the patient.       Reason for Disposition   Normal antibiotic diarrhea    Protocols used: DIARRHEA ON ANTIBIOTICS-PEDIATRIC-

## 2020-01-11 NOTE — TELEPHONE ENCOUNTER
Please check with pt on Monday morning. If persisting, pt needs stool culture, including C Diff. Let me know.  ES

## 2020-01-13 NOTE — TELEPHONE ENCOUNTER
Spoke to the pt and he stated that he has been taking medication to stop the diarrhea (didn't know what it was called) and isn't really bothered by it anymore. Pt had no symptoms today. Pt declined stool studies.

## 2020-01-21 ENCOUNTER — OFFICE VISIT (OUTPATIENT)
Dept: ENT CLINIC | Age: 20
End: 2020-01-21
Payer: COMMERCIAL

## 2020-01-21 VITALS
DIASTOLIC BLOOD PRESSURE: 72 MMHG | RESPIRATION RATE: 16 BRPM | HEIGHT: 71 IN | HEART RATE: 78 BPM | SYSTOLIC BLOOD PRESSURE: 116 MMHG | WEIGHT: 162.6 LBS | BODY MASS INDEX: 22.76 KG/M2

## 2020-01-21 PROCEDURE — G8420 CALC BMI NORM PARAMETERS: HCPCS | Performed by: OTOLARYNGOLOGY

## 2020-01-21 PROCEDURE — G8484 FLU IMMUNIZE NO ADMIN: HCPCS | Performed by: OTOLARYNGOLOGY

## 2020-01-21 PROCEDURE — 99213 OFFICE O/P EST LOW 20 MIN: CPT | Performed by: OTOLARYNGOLOGY

## 2020-01-21 PROCEDURE — 1036F TOBACCO NON-USER: CPT | Performed by: OTOLARYNGOLOGY

## 2020-01-21 PROCEDURE — G8427 DOCREV CUR MEDS BY ELIG CLIN: HCPCS | Performed by: OTOLARYNGOLOGY

## 2020-01-21 PROCEDURE — 1111F DSCHRG MED/CURRENT MED MERGE: CPT | Performed by: OTOLARYNGOLOGY

## 2020-01-21 ASSESSMENT — ENCOUNTER SYMPTOMS
FACIAL SWELLING: 0
APNEA: 0
SORE THROAT: 0
SINUS PRESSURE: 0
ABDOMINAL PAIN: 0
DIARRHEA: 0
SHORTNESS OF BREATH: 0
CHEST TIGHTNESS: 0
CHOKING: 0
WHEEZING: 0
RHINORRHEA: 0
STRIDOR: 0
COLOR CHANGE: 0
VOMITING: 0
COUGH: 0
TROUBLE SWALLOWING: 0
NAUSEA: 0
VOICE CHANGE: 0

## 2020-01-21 NOTE — PROGRESS NOTES
[facility] PROFESSIONAL [facility] EAR, NOSE AND THROAT  Migdalia Wong [id number] [facility]  Dept: [phone]  Dept Fax: [phone]  Loc: [phone]    Shreya Ashford is a 23 y.o. male who was referred byNo ref. provider found for:  Chief Complaint   Patient presents with    Follow-up     Patient here for follow up from hospital   .    HPI:     Shreya Ashford is a 23 y.o. male who presents today for his hospital stay with severe tonsillitis. There is one area that was possibly a subcentimeter abscess but responded to antibiotics and steroids and did not develop into a full-blown abscess at all. Suspect it may have just been phlegmon. It would have been impossible to find and drain. Tim Carter History:     No Known Allergies  No current outpatient medications on file. No current facility-administered medications for this visit. Past Medical History:   Diagnosis Date    Eczema       History reviewed. No pertinent surgical history. Family History   Problem Relation Age of Onset    Psoriasis Father      Social History     Tobacco Use    Smoking status: Never Smoker    Smokeless tobacco: Never Used   Substance Use Topics    Alcohol use: No       Subjective:      Review of Systems   Constitutional: Negative for activity change, appetite change, chills, diaphoresis, fatigue, fever and unexpected weight change. HENT: Negative for congestion, dental problem, ear discharge, ear pain, facial swelling, hearing loss, mouth sores, nosebleeds, postnasal drip, rhinorrhea, sinus pressure, sneezing, sore throat, tinnitus, trouble swallowing and voice change. Eyes: Negative for visual disturbance. Respiratory: Negative for apnea, cough, choking, chest tightness, shortness of breath, wheezing and stridor. Cardiovascular: Negative for chest pain, palpitations and leg swelling. Gastrointestinal: Negative for abdominal pain, diarrhea, nausea and vomiting.    Endocrine: Negative for cold intolerance, heat intolerance, polydipsia and polyuria. Genitourinary: Negative for dysuria, enuresis and hematuria. Musculoskeletal: Negative for arthralgias, gait problem, neck pain and neck stiffness. Skin: Negative for color change and rash. Allergic/Immunologic: Negative for environmental allergies, food allergies and immunocompromised state. Neurological: Negative for dizziness, syncope, facial asymmetry, speech difficulty, light-headedness and headaches. Hematological: Negative for adenopathy. Does not bruise/bleed easily. Psychiatric/Behavioral: Negative for confusion and sleep disturbance. The patient is not nervous/anxious. Objective:   /72 (Site: Left Upper Arm, Position: Sitting)   Pulse 78   Resp 16   Ht 5' 11\" (1.803 m)   Wt 162 lb 9.6 oz (73.8 kg)   BMI 22.68 kg/m²     Physical Exam   2+ symmetric tonsils. No erythema    Data:  All of the past medical history, past surgical history, family history,social history, allergies and current medications were reviewed with the patient. Assessment & Plan   Diagnoses and all orders for this visit:     Diagnosis Orders   1. Acute recurrent streptococcal tonsillitis     2. Tonsillar abscess         The findings were explained and his questions were answered. Patient is reassured. He has another severe episode perhaps a tonsillectomy might be indicated. Return if symptoms worsen or fail to improve. Nehemiah Varghese. Sha Thornton MD    **This report has been created using voice recognition software. It may contain minor errors which are inherent in voicerecognition technology. **

## 2020-02-03 ENCOUNTER — HOSPITAL ENCOUNTER (EMERGENCY)
Age: 20
Discharge: HOME | End: 2020-02-04
Attending: EMERGENCY MEDICINE
Payer: COMMERCIAL

## 2020-02-03 ENCOUNTER — APPOINTMENT (OUTPATIENT)
Dept: CT IMAGING | Age: 20
End: 2020-02-03
Payer: COMMERCIAL

## 2020-02-03 ENCOUNTER — TELEPHONE (OUTPATIENT)
Dept: ENT CLINIC | Age: 20
End: 2020-02-03

## 2020-02-03 VITALS
DIASTOLIC BLOOD PRESSURE: 76 MMHG | WEIGHT: 160 LBS | TEMPERATURE: 99.5 F | HEIGHT: 71 IN | RESPIRATION RATE: 18 BRPM | OXYGEN SATURATION: 97 % | SYSTOLIC BLOOD PRESSURE: 129 MMHG | HEART RATE: 95 BPM | BODY MASS INDEX: 22.4 KG/M2

## 2020-02-03 LAB
ANION GAP SERPL CALCULATED.3IONS-SCNC: 17 MEQ/L (ref 8–16)
BASOPHILS # BLD: 0.3 %
BASOPHILS ABSOLUTE: 0 THOU/MM3 (ref 0–0.1)
BUN BLDV-MCNC: 8 MG/DL (ref 7–22)
CALCIUM SERPL-MCNC: 10 MG/DL (ref 8.5–10.5)
CHLORIDE BLD-SCNC: 98 MEQ/L (ref 98–111)
CO2: 25 MEQ/L (ref 23–33)
CREAT SERPL-MCNC: 0.6 MG/DL (ref 0.4–1.2)
EOSINOPHIL # BLD: 0.7 %
EOSINOPHILS ABSOLUTE: 0.1 THOU/MM3 (ref 0–0.4)
ERYTHROCYTE [DISTWIDTH] IN BLOOD BY AUTOMATED COUNT: 12.6 % (ref 11.5–14.5)
ERYTHROCYTE [DISTWIDTH] IN BLOOD BY AUTOMATED COUNT: 39.2 FL (ref 35–45)
GLUCOSE BLD-MCNC: 88 MG/DL (ref 70–108)
HCT VFR BLD CALC: 46.6 % (ref 42–52)
HEMOGLOBIN: 16.1 GM/DL (ref 14–18)
IMMATURE GRANS (ABS): 0.07 THOU/MM3 (ref 0–0.07)
IMMATURE GRANULOCYTES: 0.6 %
LYMPHOCYTES # BLD: 18.3 %
LYMPHOCYTES ABSOLUTE: 2.2 THOU/MM3 (ref 1–4.8)
MCH RBC QN AUTO: 29.9 PG (ref 26–33)
MCHC RBC AUTO-ENTMCNC: 34.5 GM/DL (ref 32.2–35.5)
MCV RBC AUTO: 86.5 FL (ref 80–94)
MONOCYTES # BLD: 9 %
MONOCYTES ABSOLUTE: 1.1 THOU/MM3 (ref 0.4–1.3)
NUCLEATED RED BLOOD CELLS: 0 /100 WBC
OSMOLALITY CALCULATION: 277.1 MOSMOL/KG (ref 275–300)
PLATELET # BLD: 288 THOU/MM3 (ref 130–400)
PMV BLD AUTO: 10.8 FL (ref 9.4–12.4)
POTASSIUM SERPL-SCNC: 3.9 MEQ/L (ref 3.5–5.2)
RBC # BLD: 5.39 MILL/MM3 (ref 4.7–6.1)
SEG NEUTROPHILS: 71.1 %
SEGMENTED NEUTROPHILS ABSOLUTE COUNT: 8.7 THOU/MM3 (ref 1.8–7.7)
SODIUM BLD-SCNC: 140 MEQ/L (ref 135–145)
WBC # BLD: 12.2 THOU/MM3 (ref 4.8–10.8)

## 2020-02-03 PROCEDURE — 80048 BASIC METABOLIC PNL TOTAL CA: CPT

## 2020-02-03 PROCEDURE — 99283 EMERGENCY DEPT VISIT LOW MDM: CPT

## 2020-02-03 PROCEDURE — 70491 CT SOFT TISSUE NECK W/DYE: CPT

## 2020-02-03 PROCEDURE — 96365 THER/PROPH/DIAG IV INF INIT: CPT

## 2020-02-03 PROCEDURE — 2580000003 HC RX 258: Performed by: EMERGENCY MEDICINE

## 2020-02-03 PROCEDURE — 85025 COMPLETE CBC W/AUTO DIFF WBC: CPT

## 2020-02-03 PROCEDURE — 96375 TX/PRO/DX INJ NEW DRUG ADDON: CPT

## 2020-02-03 PROCEDURE — 36415 COLL VENOUS BLD VENIPUNCTURE: CPT

## 2020-02-03 PROCEDURE — 6360000002 HC RX W HCPCS: Performed by: EMERGENCY MEDICINE

## 2020-02-03 PROCEDURE — 6360000004 HC RX CONTRAST MEDICATION: Performed by: EMERGENCY MEDICINE

## 2020-02-03 RX ORDER — DEXAMETHASONE SODIUM PHOSPHATE 4 MG/ML
10 INJECTION, SOLUTION INTRA-ARTICULAR; INTRALESIONAL; INTRAMUSCULAR; INTRAVENOUS; SOFT TISSUE ONCE
Status: COMPLETED | OUTPATIENT
Start: 2020-02-03 | End: 2020-02-03

## 2020-02-03 RX ORDER — PREDNISONE 20 MG/1
20 TABLET ORAL 2 TIMES DAILY
Qty: 10 TABLET | Refills: 0 | Status: SHIPPED | OUTPATIENT
Start: 2020-02-03 | End: 2020-02-03

## 2020-02-03 RX ORDER — CEFDINIR 300 MG/1
300 CAPSULE ORAL 2 TIMES DAILY
Qty: 20 CAPSULE | Refills: 0 | Status: SHIPPED | OUTPATIENT
Start: 2020-02-03 | End: 2020-02-03

## 2020-02-03 RX ORDER — KETOROLAC TROMETHAMINE 30 MG/ML
15 INJECTION, SOLUTION INTRAMUSCULAR; INTRAVENOUS ONCE
Status: COMPLETED | OUTPATIENT
Start: 2020-02-03 | End: 2020-02-03

## 2020-02-03 RX ORDER — SODIUM CHLORIDE 9 MG/ML
1000 INJECTION, SOLUTION INTRAVENOUS CONTINUOUS
Status: DISCONTINUED | OUTPATIENT
Start: 2020-02-03 | End: 2020-02-04 | Stop reason: HOSPADM

## 2020-02-03 RX ADMIN — SODIUM CHLORIDE 1000 ML: 9 INJECTION, SOLUTION INTRAVENOUS at 21:14

## 2020-02-03 RX ADMIN — IOPAMIDOL 80 ML: 755 INJECTION, SOLUTION INTRAVENOUS at 21:53

## 2020-02-03 RX ADMIN — CEFTRIAXONE SODIUM 1 G: 1 INJECTION, POWDER, FOR SOLUTION INTRAMUSCULAR; INTRAVENOUS at 21:15

## 2020-02-03 RX ADMIN — DEXAMETHASONE SODIUM PHOSPHATE 10 MG: 4 INJECTION, SOLUTION INTRA-ARTICULAR; INTRALESIONAL; INTRAMUSCULAR; INTRAVENOUS; SOFT TISSUE at 21:14

## 2020-02-03 RX ADMIN — KETOROLAC TROMETHAMINE 15 MG: 30 INJECTION, SOLUTION INTRAMUSCULAR at 22:59

## 2020-02-03 ASSESSMENT — PAIN DESCRIPTION - DESCRIPTORS: DESCRIPTORS: DISCOMFORT;TIGHTNESS

## 2020-02-03 ASSESSMENT — PAIN SCALES - GENERAL
PAINLEVEL_OUTOF10: 5
PAINLEVEL_OUTOF10: 5
PAINLEVEL_OUTOF10: 3
PAINLEVEL_OUTOF10: 6

## 2020-02-03 ASSESSMENT — PAIN DESCRIPTION - PAIN TYPE: TYPE: ACUTE PAIN

## 2020-02-03 ASSESSMENT — ENCOUNTER SYMPTOMS
NAUSEA: 0
TROUBLE SWALLOWING: 0
COUGH: 0
SHORTNESS OF BREATH: 0
VOMITING: 0
RHINORRHEA: 0

## 2020-02-03 ASSESSMENT — PAIN DESCRIPTION - ORIENTATION: ORIENTATION: RIGHT

## 2020-02-03 NOTE — ED TRIAGE NOTES
Pt to er. Pt c/o abscess rt side of neck. States was seen here last month and admitted for same thing. Pt states hard to swallow and hurts. States unsure if he has had fevers or not. Pt states would like to wait to see doctor before any tests are ordered.  States trying to keep down on cost.

## 2020-02-03 NOTE — TELEPHONE ENCOUNTER
Patient called and stated that he is concerned that he has another abscess. He stated that he has a sore throat on the right side with ear pain. He stated that he was on Augmentin for 2 weeks completed it about 1.5 weeks ago. Asked patient if he can see anything in the back of the throat and he stated that he cant really open his mouth that it causes him pain. He stated that he has trouble swallowing. Asked patient if he has noticed any trouble breathing and denied any trouble breathing. Patient is scheduled with Dr. Marjorie Harper on Thursday.

## 2020-02-04 ENCOUNTER — OFFICE VISIT (OUTPATIENT)
Dept: ENT CLINIC | Age: 20
End: 2020-02-04
Payer: COMMERCIAL

## 2020-02-04 VITALS
SYSTOLIC BLOOD PRESSURE: 122 MMHG | BODY MASS INDEX: 22.72 KG/M2 | DIASTOLIC BLOOD PRESSURE: 70 MMHG | HEART RATE: 76 BPM | RESPIRATION RATE: 12 BRPM | WEIGHT: 162.9 LBS

## 2020-02-04 PROCEDURE — G8427 DOCREV CUR MEDS BY ELIG CLIN: HCPCS | Performed by: OTOLARYNGOLOGY

## 2020-02-04 PROCEDURE — 99213 OFFICE O/P EST LOW 20 MIN: CPT | Performed by: OTOLARYNGOLOGY

## 2020-02-04 PROCEDURE — 1036F TOBACCO NON-USER: CPT | Performed by: OTOLARYNGOLOGY

## 2020-02-04 PROCEDURE — G8420 CALC BMI NORM PARAMETERS: HCPCS | Performed by: OTOLARYNGOLOGY

## 2020-02-04 PROCEDURE — G8484 FLU IMMUNIZE NO ADMIN: HCPCS | Performed by: OTOLARYNGOLOGY

## 2020-02-04 RX ORDER — AMOXICILLIN AND CLAVULANATE POTASSIUM 875; 125 MG/1; MG/1
1 TABLET, FILM COATED ORAL 2 TIMES DAILY
Qty: 20 TABLET | Refills: 0 | Status: SHIPPED | OUTPATIENT
Start: 2020-02-04 | End: 2020-02-14

## 2020-02-04 RX ORDER — LACTOBACILLUS RHAMNOSUS GG 10B CELL
1 CAPSULE ORAL DAILY
Qty: 20 CAPSULE | Refills: 0 | Status: SHIPPED | OUTPATIENT
Start: 2020-02-04 | End: 2020-09-16 | Stop reason: ALTCHOICE

## 2020-02-04 ASSESSMENT — ENCOUNTER SYMPTOMS
ABDOMINAL PAIN: 0
VOICE CHANGE: 0
DIARRHEA: 0
COLOR CHANGE: 0
VOMITING: 0
FACIAL SWELLING: 0
RHINORRHEA: 0
SINUS PRESSURE: 0
NAUSEA: 0
WHEEZING: 0
CHEST TIGHTNESS: 0
APNEA: 0
VOICE CHANGE: 0
COUGH: 0
STRIDOR: 0
CHEST TIGHTNESS: 0
SORE THROAT: 1
TROUBLE SWALLOWING: 1
SHORTNESS OF BREATH: 0
CHOKING: 0
SORE THROAT: 1

## 2020-02-04 NOTE — ED NOTES
Pt brought back to room 15 from intake for assessment. Dr Adelaida Cruz to bedside for pt assessment.       Arneta Schwab, RN  02/03/20 0257

## 2020-02-04 NOTE — ED PROVIDER NOTES
problem, drooling, rhinorrhea, trouble swallowing and voice change. Admits painful swallowing   Respiratory: Negative for cough, chest tightness and shortness of breath. Cardiovascular: Negative for chest pain. Gastrointestinal: Negative for nausea and vomiting. Musculoskeletal: Negative for arthralgias and myalgias. Skin: Negative for rash. Hematological: Negative for adenopathy. Does not bruise/bleed easily. All other systems reviewed and are negative. PAST MEDICAL HISTORY    has a past medical history of Eczema. SURGICAL HISTORY    has no past surgical history on file. CURRENT MEDICATIONS       Previous Medications    No medications on file       ALLERGIES     has No Known Allergies. FAMILY HISTORY     He indicated that the status of his father is unknown.   family history includes Psoriasis in his father. SOCIAL HISTORY      reports that he has never smoked. He has never used smokeless tobacco. He reports that he does not drink alcohol or use drugs. PHYSICAL EXAM     INITIAL VITALS:  height is 5' 11\" (1.803 m) and weight is 160 lb (72.6 kg). His oral temperature is 99.5 °F (37.5 °C). His blood pressure is 129/76 and his pulse is 95. His respiration is 18 and oxygen saturation is 97%. Physical Exam  Vitals signs and nursing note reviewed. Constitutional:       Appearance: He is well-developed. He is not diaphoretic. HENT:      Head: Normocephalic and atraumatic. Jaw: No trismus or tenderness. Comments: No signs of trismus. Right Ear: External ear normal.      Left Ear: External ear normal.      Mouth/Throat:      Mouth: Mucous membranes are moist.      Tongue: Tongue does not protrude in midline. Pharynx: Uvula midline. Pharyngeal swelling and posterior oropharyngeal erythema present. No oropharyngeal exudate or uvula swelling. Tonsils: Tonsillar exudate present. No tonsillar abscesses. Swellin+ on the right. 4+ on the left. Comments: Symmetric bilateral tonsillar hypertrophy. Uvula is midline  Posterior pharnyx clear without exudate. Symmetric swelling  No tongue elevation. No Cellulitis   No neck swelling  No neck crepitus  Eyes:      General: No scleral icterus. Right eye: No discharge. Left eye: No discharge. Conjunctiva/sclera: Conjunctivae normal.   Neck:      Musculoskeletal: Normal range of motion and neck supple. No neck rigidity or muscular tenderness. Vascular: No JVD. Pulmonary:      Effort: Pulmonary effort is normal. No respiratory distress. Breath sounds: Normal breath sounds. No stridor, decreased air movement or transmitted upper airway sounds. No decreased breath sounds, wheezing, rhonchi or rales. Comments: No stridor. Non tripod positioning. No signs of respiratory distress. Abdominal:      General: There is no distension. Palpations: Abdomen is soft. Musculoskeletal: Normal range of motion. Lymphadenopathy:      Cervical: Cervical adenopathy present. Skin:     General: Skin is warm and dry. Capillary Refill: Capillary refill takes less than 2 seconds. Findings: No erythema. Neurological:      Mental Status: He is alert and oriented to person, place, and time. Cranial Nerves: No cranial nerve deficit. Motor: No abnormal muscle tone.    Psychiatric:         Behavior: Behavior normal.         DIFFERENTIAL DIAGNOSIS:   Phlegmon, tonsilitis, PTA, Strep pharyngitis, Mono, Unlikely RPA, Ludwigs angina, Lemierre's syndrome or epiglottitis    DIAGNOSTIC RESULTS     EKG: All EKG's are interpreted by the Emergency Department Physician who either signs or Co-signs this chart in the absence of a cardiologist.  EKG interpreted by Sadie Araya, DO:    None    RADIOLOGY: non-plain film images(s) such as CT, Ultrasound and MRI are read by the radiologist.    CT Soft Tissue Neck W Contrast   Final Result   Findings consistent with acute tonsillitis, more severe on the right, without discrete abscess. Probable right tonsillar phlegmon. Thickened edematous uvula. Bilateral cervical and right submandibular region probably reactive adenopathy. Edema around the right carotid artery and jugular vein extending inferiorly adjacent to the right submandibular gland. **This report has been created using voice recognition software. It may contain minor errors which are inherent in voice recognition technology. **      Final report electronically signed by Dr. Damaso Fisher on 2/3/2020 10:20 PM           LABS:   Labs Reviewed   CBC WITH AUTO DIFFERENTIAL - Abnormal; Notable for the following components:       Result Value    WBC 12.2 (*)     Segs Absolute 8.7 (*)     All other components within normal limits   ANION GAP - Abnormal; Notable for the following components:    Anion Gap 17.0 (*)     All other components within normal limits   BASIC METABOLIC PANEL   OSMOLALITY   GROUP A STREP, REFLEX       EMERGENCY DEPARTMENT COURSE:   Vitals:    Vitals:    02/03/20 2211 02/03/20 2259 02/03/20 2339 02/03/20 2340   BP: (!) 150/84  129/76    Pulse: 100 119 102 95   Resp: 19 19 18    Temp:       TempSrc:       SpO2: 100% 100% 97%    Weight:       Height:           8:55 PM: The patient was seen and evaluated. Patient progressively improved during his ED stay. He reports he felt better after Abx and Decadron. He was able to swallow, eat and drink without difficulty. No signs of airway issues. MDM:  I estimate there is LOW risk for GIOVANY'S ANGINA, PERITONSILLAR ABSCESS, SEPSIS, RETROPHARYNGEAL ABSCESS, LEMIERRE's, OR EPIGLOTTITIS thus I consider the discharge disposition reasonable. Patient improved during ED stay. He did not appear toxic or septic. His HR was elevated initially likely from Pain and his prolonged wait in the waiting room as this readily improved. WBC count elevated slightly at 12 I don't believe this is of clinically significance.  I discussed case mis-transcribed.)    Scribe:  Anna Cyr 2/3/20 8:55 PM Scribing for and in the presence of Reno Shi DO. Signed by: Kamala Hooks, 02/03/20 8:55 PM    Provider:  I personally performed the services described in the documentation, reviewed and edited the documentation which was dictated to the scribe in my presence, and it accurately records my words and actions.     Reno Shi DO 2/3/20 8:55 PM     Reno Shi DO  02/04/20 0630

## 2020-02-04 NOTE — ED NOTES
Bedside report received at this time from [facility]. Will monitor.      Macy Morfin RN  02/03/20 5975

## 2020-02-04 NOTE — ED PROVIDER NOTES
[facility] Triage Note-Initiation of of Medical Screening Exam      Shellie Xiao is a 23 y.o. male who presents to the Emergency Department with complaint of right neck swelling. The patient has a history of recurrent peritonsillar abscesses. C/O right neck swelling, fullness, trismus. Brief Exam  Flushed, warm to the touch. Right sided neck swelling is noted, cervical and submandibular lymphadenopathy. Trismus. Interventions:  Labs and CT scan ordered. Moved to higher level of care. The patient was initially triaged and workup initiated by Jelly Villegas Please see provider dictation for full History and Physical for further details of the patient's visit today.             Jelly Villegas, YISSEL - KATARINA  02/03/20 2161

## 2020-02-04 NOTE — ED NOTES
ED nurse-to-nurse bedside report    Chief Complaint   Patient presents with    Neck Swelling     ABSCESS last month      LOC: alert and orientated to name, place, date  Vital signs   Vitals:    02/03/20 2041 02/03/20 2042 02/03/20 2113 02/03/20 2211   BP:  (!) 163/107 (!) 144/84 (!) 150/84   Pulse: 114  107 100   Resp: 19 19 19   Temp:       TempSrc:       SpO2: 100%  100% 100%   Weight:       Height:          Pain:    Pain Interventions: Toradol  Pain Goal: 3  Oxygen: No    Current needs required Follow up with Dr Howie Espinal regarding CT    Telemetry: No  LDAs:   Peripheral IV 02/03/20 Right Hand (Active)   Site Assessment Clean;Dry; Intact 2/3/2020  8:46 PM   Line Status Flushed;Normal saline locked 2/3/2020  8:46 PM   Dressing Status Clean; Intact;Dry 2/3/2020  8:46 PM     Continuous Infusions:    sodium chloride Stopped (02/03/20 2210)     Mobility: Independent  Kiser Fall Risk Score: No flowsheet data found.   Fall Interventions: N/A  Report given to: Parisa Sands RN  02/03/20 7847

## 2020-02-05 ENCOUNTER — TELEPHONE (OUTPATIENT)
Dept: FAMILY MEDICINE CLINIC | Age: 20
End: 2020-02-05

## 2020-02-06 ENCOUNTER — OFFICE VISIT (OUTPATIENT)
Dept: ENT CLINIC | Age: 20
End: 2020-02-06
Payer: COMMERCIAL

## 2020-02-06 VITALS
SYSTOLIC BLOOD PRESSURE: 106 MMHG | HEIGHT: 71 IN | DIASTOLIC BLOOD PRESSURE: 70 MMHG | RESPIRATION RATE: 12 BRPM | BODY MASS INDEX: 23.46 KG/M2 | WEIGHT: 167.6 LBS | HEART RATE: 70 BPM

## 2020-02-06 PROCEDURE — G8427 DOCREV CUR MEDS BY ELIG CLIN: HCPCS | Performed by: PHYSICIAN ASSISTANT

## 2020-02-06 PROCEDURE — G8484 FLU IMMUNIZE NO ADMIN: HCPCS | Performed by: PHYSICIAN ASSISTANT

## 2020-02-06 PROCEDURE — G8420 CALC BMI NORM PARAMETERS: HCPCS | Performed by: PHYSICIAN ASSISTANT

## 2020-02-06 PROCEDURE — 1036F TOBACCO NON-USER: CPT | Performed by: PHYSICIAN ASSISTANT

## 2020-02-06 PROCEDURE — 99212 OFFICE O/P EST SF 10 MIN: CPT | Performed by: PHYSICIAN ASSISTANT

## 2020-02-06 ASSESSMENT — ENCOUNTER SYMPTOMS
CHOKING: 0
FACIAL SWELLING: 0
SINUS PRESSURE: 0
APNEA: 0
VOICE CHANGE: 0
ABDOMINAL PAIN: 0
DIARRHEA: 0
TROUBLE SWALLOWING: 0
SHORTNESS OF BREATH: 0
COUGH: 0
WHEEZING: 0
RHINORRHEA: 0
VOMITING: 0
STRIDOR: 0
SORE THROAT: 1
COLOR CHANGE: 0
CHEST TIGHTNESS: 0
NAUSEA: 0

## 2020-02-06 NOTE — PROGRESS NOTES
[facility] PROFESSIONAL [facility] EAR, NOSE AND THROAT  [address] [facility]  Dept: [phone]  Dept Fax: [phone]  Loc: [phone]    Ted Salazar is a 23 y.o. male who was referred by No ref. provider found for:  Chief Complaint   Patient presents with    Follow-up     Patient is here for a follow up. Vernell Faust HPI:     The patient is here for follow up on right tonsillar swelling. The patient was seen by Dr. Jose Luis Gibbs for follow up on  2/4/20 after hospitalization for severe tonsillitis, with possible phlegmon. The patient called the office the day before (2/3/20) stating that he was concerned he may be developing another abscess since the swelling was worsening on the right again. Recommended he go to the ER for evaluation. New CT of neck revealed acute tonsillitis with probable right tonsillar phlegmon. The patient then followed up with Dr. Jose Luis Gibbs who put him on Augmentin and recommended short follow up. I saw the patient during the visit on 2/4/20 so I could see the swelling at that time and monitor for changes during the follow up today. The patient reports he feels like the pain and swelling has slightly improved. He has had 5 doses of the Augmentin. He reports that opening his mouth continues to feel just slightly limited, although he reports mostly due to pain. He denies drooling, shortness of breath, fevers, chills. The patient reports that he has been getting strep 1-2 times a year for the many years. No other concerns at this time. Subjective:        Review of Systems   Constitutional: Negative for activity change, appetite change, chills, diaphoresis, fatigue, fever and unexpected weight change. HENT: Positive for sore throat.  Negative for congestion, dental problem, ear discharge, ear pain, facial swelling, hearing loss, mouth sores, nosebleeds, postnasal drip, rhinorrhea, sinus pressure, sneezing, tinnitus, trouble swallowing and voice carotid artery and jugular vein extending inferiorly adjacent to the right submandibular gland. There is no discrete abscess. There is no evidence of airway compromise. Retropharyngeal soft tissues are normal.   The cervical and upper thoracic esophagus are unremarkable. There is bilateral cervical adenopathy,, more severe on the right and likely reactive. There is also right submandibular region adenopathy.       Salivary glands: The salivary glands are normal.       Thyroid: unremarkable.       Vasculature: There is no hemodynamically significant stenosis or occlusion in the imaged carotid and vertebral arteries.       Lungs: The imaged lung apices are clear.       Mediastinum: The imaged structures are unremarkable.       Sinuses: The imaged sinuses are clear.       Mastoids: The imaged mastoid air cells are clear.       Musculoskeletal: There is reversal of normal cervical lordosis.       Intracranial contents: Imaged portions unremarkable       Intraorbital contents: Imaged portions unremarkable               Impression   Findings consistent with acute tonsillitis, more severe on the right, without discrete abscess. Probable right tonsillar phlegmon. Thickened edematous uvula. Bilateral cervical and right submandibular region probably reactive adenopathy. Edema around the right carotid artery and jugular vein extending inferiorly adjacent to the right submandibular gland. Assessment/Plan:     Diagnosis Orders   1. Acute recurrent streptococcal tonsillitis     2. Tonsillar abscess         The patient is a 23 y.o. male that presents for follow up on tonsillitis. The patient's right tonsils shows moderate improvement in swelling in just 5 doses of Augmentin. I recommended the patient continue the current antibiotic regimen. He will return after he completes the course of antibiotics with Dr. Alyssa Tesfaye for follow up and to discuss possible T&A.  I gave the patient strict instructions to return to the ER if the swelling worsens, develops shortness of breath, fevers, chills, drooling. He expresses understanding and thanked me. He will contact the office sooner with new or worsening symptoms.      Electronically signed by KIKI Westbrook on 2/6/2020 at 4:27 PM

## 2020-02-27 ENCOUNTER — OFFICE VISIT (OUTPATIENT)
Dept: ENT CLINIC | Age: 20
End: 2020-02-27
Payer: COMMERCIAL

## 2020-02-27 VITALS
WEIGHT: 166.5 LBS | RESPIRATION RATE: 10 BRPM | BODY MASS INDEX: 23.22 KG/M2 | SYSTOLIC BLOOD PRESSURE: 110 MMHG | HEART RATE: 80 BPM | DIASTOLIC BLOOD PRESSURE: 68 MMHG

## 2020-02-27 PROCEDURE — G8427 DOCREV CUR MEDS BY ELIG CLIN: HCPCS | Performed by: OTOLARYNGOLOGY

## 2020-02-27 PROCEDURE — 99213 OFFICE O/P EST LOW 20 MIN: CPT | Performed by: OTOLARYNGOLOGY

## 2020-02-27 PROCEDURE — 1036F TOBACCO NON-USER: CPT | Performed by: OTOLARYNGOLOGY

## 2020-02-27 PROCEDURE — G8420 CALC BMI NORM PARAMETERS: HCPCS | Performed by: OTOLARYNGOLOGY

## 2020-02-27 PROCEDURE — G8484 FLU IMMUNIZE NO ADMIN: HCPCS | Performed by: OTOLARYNGOLOGY

## 2020-02-27 ASSESSMENT — ENCOUNTER SYMPTOMS
SINUS PRESSURE: 0
ABDOMINAL PAIN: 0
TROUBLE SWALLOWING: 0
APNEA: 0
STRIDOR: 0
VOICE CHANGE: 0
WHEEZING: 0
NAUSEA: 0
FACIAL SWELLING: 0
SORE THROAT: 0
VOMITING: 0
RHINORRHEA: 0
SHORTNESS OF BREATH: 0
COUGH: 0
COLOR CHANGE: 0
CHOKING: 0
DIARRHEA: 0
CHEST TIGHTNESS: 0

## 2020-02-27 NOTE — PROGRESS NOTES
[facility] PROFESSIONAL [facility] EAR, NOSE AND THROAT  Migdalia Wong [address] [facility]  Dept: [phone]  Dept Fax: [phone]  Loc: [phone]    Miguelina Boston is a 23 y.o. male who was referred byNo ref. provider found for:  Chief Complaint   Patient presents with    Follow-up     patient is here for 3 week follow up   . HPI:     Miguelina Boston is a 23 y.o. male who presents today for evaluation for tonsillectomy and adenoidectomy because of his severe episodes of tonsillitis. He continues to have large tonsils despite weeks of antibiotic therapy. He requests that his tonsils be removed    History:     No Known Allergies  Current Outpatient Medications   Medication Sig Dispense Refill    lactobacillus (CULTURELLE) capsule Take 1 capsule by mouth daily 20 capsule 0     No current facility-administered medications for this visit. Past Medical History:   Diagnosis Date    Eczema       History reviewed. No pertinent surgical history. Family History   Problem Relation Age of Onset    Psoriasis Father      Social History     Tobacco Use    Smoking status: Never Smoker    Smokeless tobacco: Never Used   Substance Use Topics    Alcohol use: No       Subjective:      Review of Systems   Constitutional: Negative for activity change, appetite change, chills, diaphoresis, fatigue, fever and unexpected weight change. HENT: Negative for congestion, dental problem, ear discharge, ear pain, facial swelling, hearing loss, mouth sores, nosebleeds, postnasal drip, rhinorrhea, sinus pressure, sneezing, sore throat, tinnitus, trouble swallowing and voice change. Eyes: Negative for visual disturbance. Respiratory: Negative for apnea, cough, choking, chest tightness, shortness of breath, wheezing and stridor. Cardiovascular: Negative for chest pain, palpitations and leg swelling. Gastrointestinal: Negative for abdominal pain, diarrhea, nausea and vomiting. Conjugate gaze   Neck:      Musculoskeletal: Neck supple. Thyroid: No thyroid mass or thyromegaly. Trachea: Phonation normal. No tracheal deviation. Comments:   Salivary glands not enlarged and normal to palpation    Pulmonary:      Effort: Pulmonary effort is normal. No retractions. Breath sounds: No stridor. Neurological:      Mental Status: He is alert and oriented to person, place, and time. Cranial Nerves: No cranial nerve deficit (VIIth N function intact bilat). Psychiatric:         Mood and Affect: Mood and affect normal.         Behavior: Behavior is cooperative. Data:  All of the past medical history, past surgical history, family history,social history, allergies and current medications were reviewed with the patient. Assessment & Plan   Diagnoses and all orders for this visit:     Diagnosis Orders   1. Acute recurrent streptococcal tonsillitis     2. Tonsillar abscess     3. Tonsillar hypertrophy, unilateral     4. Chronic tonsillitis         The findings were explained and his questions were answered. Patient certainly qualifies for tonsillectomy, and in fact it may be the only way we get his tonsillitis under control. Decision will be made about adenoidectomy at the time based on the appearance and size of the adenoids. It was recommended that the patient undergo a tonsillectomy and probable adenoidectomy. The risks and benefits were discussed with the patient, including: bleeding, infection, change in voice, velopharyngeal insufficiency. The patient's questions regarding surgery were discussed in detail and their concerns were addressed. No guarantees were made. The patient requests we proceed. The  is not available this date so we will set things up over the telephone. Diamond Barragan. Priya Root MD    **This report has been created using voice recognition software.  It may contain minor errors which are inherent in voicerecognition technology. **

## 2020-03-09 ENCOUNTER — TELEPHONE (OUTPATIENT)
Dept: ENT CLINIC | Age: 20
End: 2020-03-09

## 2020-03-09 NOTE — TELEPHONE ENCOUNTER
Patient was seen on 2/27 and needs scheduled for SX (T&A?). I spoke to patient at . [address] and he wanted to possibly schedule for 4/20. Dr. James Arevalo note and orders still not complete. I tried calling patient to see if he would still like this date, and  is not set up so unable to leave a message. Dr. Adriana Back, please put orders in so patient can be scheduled.

## 2020-03-09 NOTE — TELEPHONE ENCOUNTER
Patient saw my call, and called me right back. He stated that he has to go to [facility] for work so more than likely he will want to schedule in May. He will call me back once his boss gives him the exact dates.

## 2020-03-17 NOTE — TELEPHONE ENCOUNTER
Received orders 3/16; however, holding off on scheduling due to hold of surgeries at this time (COVID-19).

## 2020-07-13 ENCOUNTER — TELEPHONE (OUTPATIENT)
Dept: ENT CLINIC | Age: 20
End: 2020-07-13

## 2020-08-28 ENCOUNTER — TELEPHONE (OUTPATIENT)
Dept: ENT CLINIC | Age: 20
End: 2020-08-28

## 2020-08-28 NOTE — TELEPHONE ENCOUNTER
Attempted to call patient to inform him that surgery is now 09/14/2020 and his pre-op is 09/08/2020 @9:00. Voicemail box is full and unable to leave message.

## 2020-08-28 NOTE — TELEPHONE ENCOUNTER
Patient called wanting to reschedule surgery from 9/28/20 to 9/14/20. Phone number [phone]. Patient would prefer 9/14/20.

## 2020-09-16 NOTE — PROGRESS NOTES
NPO after midnight except sip of water with heart/BP meds  Follow all instructions given by surgeon including medications to hold  Bring insurance card and photo ID  Shower the night before or morning of procedure with liquid antibacterial soap  Wear comfortable clothing  Do not bring jewelry or valuables  Bring list of medications with dosage and how often taken if not reviewed   needed at discharge at least 25years old  Call PAT at [phone] for questions    Instructed to call surgery center at [phone] upon arrival to speak with  before entering building. Covid screen due  at [facility] 6 to 7 days before procedure. Pt plans to have completed on 9/22/2020 at [facility]. In preparation for their surgical procedure above patient was screened for Obstructive Sleep Apnea (BEATRICE) using the STOP-Bang Questionnaire by the Pre-Admission Testing department. This is a pre-surgical screening tool for patient safety and serves as a recommendation, this WILL NOT cause cancellation of surgery. STOP-Bang Questionnaire  * Do you currently see a pulmonologist?  No       1. Do you snore loudly (able to be heard in the next room)? No    2. Do you often feel tired or sleepy during the daytime? No       3. Has anyone ever told you that you stop breathing during your sleep? No    4. Do you have or are you being treated for high blood pressure? No      5. BMI more than 35? BMI (Calculated): 21        No    6. Age over [address] years? 21 y.o. No    7. Neck Circumference greater than 17 inches for male or 16 inches for female? Measured           (visits only)            Not Applicable    8. Gender Male?                  Yes      TOTAL SCORE: 1    BEATRICE - Low Risk : Yes to 0 - 2 questions  BEATRICE - Intermediate Risk : Yes to 3 - 4 questions  BEATRICE - High Risk : Yes to 5 - 8 questions    Adapted from:   STOP Questionnaire: A Tool to Screen Patients for Obstructive Sleep Apnea [facility]arrison[facility]., EVERETT Tejeda.B.B.S., Manisha Watts M.D., Chelsy Grace. Doreen Tyler, Ph.D., EVERETT Dutton.B.B.S., Kaylene Parkinson, M.Sc., Larissa Leahy M.D., Eusebio Odom. [place], F.R.C.P.C.    Anesthesiology 2008; 241:484-80 Copyright 2008, the [address] [place] [address] of Anesthesiologists, [place] [address].   ----------------------------------------------------------------------------------------------------------------

## 2020-09-22 ENCOUNTER — HOSPITAL ENCOUNTER (OUTPATIENT)
Age: 20
Discharge: HOME | End: 2020-09-22
Payer: COMMERCIAL

## 2020-09-22 ENCOUNTER — OFFICE VISIT (OUTPATIENT)
Dept: ENT CLINIC | Age: 20
End: 2020-09-22

## 2020-09-22 VITALS
TEMPERATURE: 98.5 F | SYSTOLIC BLOOD PRESSURE: 130 MMHG | RESPIRATION RATE: 16 BRPM | DIASTOLIC BLOOD PRESSURE: 86 MMHG | HEART RATE: 72 BPM | WEIGHT: 171.8 LBS | BODY MASS INDEX: 23.96 KG/M2

## 2020-09-22 LAB
BASOPHILS # BLD: 0.4 %
BASOPHILS ABSOLUTE: 0 THOU/MM3 (ref 0–0.1)
EOSINOPHIL # BLD: 1.3 %
EOSINOPHILS ABSOLUTE: 0.1 THOU/MM3 (ref 0–0.4)
ERYTHROCYTE [DISTWIDTH] IN BLOOD BY AUTOMATED COUNT: 11.9 % (ref 11.5–14.5)
ERYTHROCYTE [DISTWIDTH] IN BLOOD BY AUTOMATED COUNT: 37.9 FL (ref 35–45)
HCT VFR BLD CALC: 50.3 % (ref 42–52)
HEMOGLOBIN: 17.3 GM/DL (ref 14–18)
IMMATURE GRANS (ABS): 0.01 THOU/MM3 (ref 0–0.07)
IMMATURE GRANULOCYTES: 0.2 %
LYMPHOCYTES # BLD: 22.1 %
LYMPHOCYTES ABSOLUTE: 1.2 THOU/MM3 (ref 1–4.8)
MCH RBC QN AUTO: 30.2 PG (ref 26–33)
MCHC RBC AUTO-ENTMCNC: 34.4 GM/DL (ref 32.2–35.5)
MCV RBC AUTO: 87.9 FL (ref 80–94)
MONOCYTES # BLD: 7.7 %
MONOCYTES ABSOLUTE: 0.4 THOU/MM3 (ref 0.4–1.3)
NUCLEATED RED BLOOD CELLS: 0 /100 WBC
PLATELET # BLD: 256 THOU/MM3 (ref 130–400)
PMV BLD AUTO: 10.9 FL (ref 9.4–12.4)
RBC # BLD: 5.72 MILL/MM3 (ref 4.7–6.1)
SEG NEUTROPHILS: 68.3 %
SEGMENTED NEUTROPHILS ABSOLUTE COUNT: 3.8 THOU/MM3 (ref 1.8–7.7)
WBC # BLD: 5.6 THOU/MM3 (ref 4.8–10.8)

## 2020-09-22 PROCEDURE — U0003 INFECTIOUS AGENT DETECTION BY NUCLEIC ACID (DNA OR RNA); SEVERE ACUTE RESPIRATORY SYNDROME CORONAVIRUS 2 (SARS-COV-2) (CORONAVIRUS DISEASE [COVID-19]), AMPLIFIED PROBE TECHNIQUE, MAKING USE OF HIGH THROUGHPUT TECHNOLOGIES AS DESCRIBED BY CMS-2020-01-R: HCPCS

## 2020-09-22 PROCEDURE — 36415 COLL VENOUS BLD VENIPUNCTURE: CPT

## 2020-09-22 PROCEDURE — 85025 COMPLETE CBC W/AUTO DIFF WBC: CPT

## 2020-09-22 PROCEDURE — 99999 PR OFFICE/OUTPT VISIT,PROCEDURE ONLY: CPT | Performed by: PHYSICIAN ASSISTANT

## 2020-09-22 ASSESSMENT — ENCOUNTER SYMPTOMS
SORE THROAT: 0
SHORTNESS OF BREATH: 0
APNEA: 0
FACIAL SWELLING: 0
VOICE CHANGE: 0
COUGH: 0
ABDOMINAL PAIN: 0
SINUS PRESSURE: 0
STRIDOR: 0
CHEST TIGHTNESS: 0
CHOKING: 0
DIARRHEA: 0
TROUBLE SWALLOWING: 0
RHINORRHEA: 0
VOMITING: 0
WHEEZING: 0
NAUSEA: 0
COLOR CHANGE: 0

## 2020-09-22 NOTE — PROGRESS NOTES
[facility] PROFESSIONAL [facility] EAR, NOSE AND THROAT  Migdalia Wong [address] [facility]  Dept: [phone]  Dept Fax: [phone]  Loc: [phone]    Татьяна Knox is a 21 y.o. male who was referred by No ref. provider found for:  Chief Complaint   Patient presents with    Pre-op Exam     Patient is here pre-op for T & A 09/28/2020. Bhavesh Becker HPI:     The patient presents for pre-op examination. The patient is scheduled for tonsillectomy and adenoidectomy with Dr. Avtar Watts on 9/28/20. The patient denies any changes to his medical history and medications since he was last seen. He denies any interval throat infections. He denies any other concerns at this time. Subjective:        Review of Systems   Constitutional: Negative for activity change, appetite change, chills, diaphoresis, fatigue, fever and unexpected weight change. HENT: Negative for congestion, dental problem, ear discharge, ear pain, facial swelling, hearing loss, mouth sores, nosebleeds, postnasal drip, rhinorrhea, sinus pressure, sneezing, sore throat, tinnitus, trouble swallowing and voice change. Eyes: Negative for visual disturbance. Respiratory: Negative for apnea, cough, choking, chest tightness, shortness of breath, wheezing and stridor. Cardiovascular: Negative for chest pain, palpitations and leg swelling. Gastrointestinal: Negative for abdominal pain, diarrhea, nausea and vomiting. Endocrine: Negative for cold intolerance, heat intolerance, polydipsia and polyuria. Genitourinary: Negative for difficulty urinating, discharge, dysuria, enuresis, hematuria, penile pain, penile swelling, scrotal swelling, testicular pain and urgency. Musculoskeletal: Negative for arthralgias, gait problem, neck pain and neck stiffness. Skin: Negative for color change, rash and wound. Allergic/Immunologic: Negative for environmental allergies, food allergies and immunocompromised state.    Neurological: right slightly larger than the left. They are cryptic and chronic in appearance. No significant erythema, no exudates noted. Oropharynx: normal-appearing mucosa  Hard and soft palates: symmetrical and intact. Salivary glands: not enlarged and no tenderness to palpation. Uvula: midline, no obvious lesions   Gag reflex is present. Neck: Trachea midline. Thyroid not enlarged, no palpable masses or tenderness. Lymphatic: No cervical lymphadenopathy noted. Eyes: KERRY, EOM intact. Conjunctiva moist without discharge. Lungs: Normal effort of breathing, not obviously distressed. Neuro: Cranial nerves II-XII grossly intact. Extremities: No clubbing, edema, or cyanosis noted. Assessment/Plan:     Diagnosis Orders   1. Acute recurrent streptococcal tonsillitis     2. Chronic tonsillitis     3. Tonsillar hypertrophy, unilateral     4. Tonsillar abscess         The patient is a 21 y.o. male that presents for preop examination. I explained the typical day of surgery, postop course, postop follow-up with the patient. I discussed that with the patient that he would need to be off work while taking the narcotic for pain management which will likely be the first week postoperatively. 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 Also discussed avoiding garlic and NSAIDs 1 week preoperatively. The patient will get his CBC and COVID tests completed after her visit reportedly. Patient denies any other concerns at this time. He will follow-up for surgery, but will contact the office before surgery with further concerns/questions.     Electronically signed by KIKI Dowell on 9/22/2020 at 11:30 AM

## 2020-09-23 LAB — SARS-COV-2: NOT DETECTED

## 2020-09-25 NOTE — H&P
Expand All Collapse All        [facility] PROFESSIONAL [facility] EAR, NOSE AND THROAT  [facility]  Dept: [phone]  Dept Fax: [phone]  Loc: [phone]     Robby Maxwell is a 21 y.o. male who was referred by No ref. provider found for:       Chief Complaint   Patient presents with    Pre-op Exam       Patient is here pre-op for T & A 09/28/2020. .     HPI:      The patient presents for pre-op examination. The patient is scheduled for tonsillectomy and adenoidectomy with Dr. Talat Ramon on 9/28/20. The patient denies any changes to his medical history and medications since he was last seen. He denies any interval throat infections. He denies any other concerns at this time. Subjective:         Review of Systems   Constitutional: Negative for activity change, appetite change, chills, diaphoresis, fatigue, fever and unexpected weight change. HENT: Negative for congestion, dental problem, ear discharge, ear pain, facial swelling, hearing loss, mouth sores, nosebleeds, postnasal drip, rhinorrhea, sinus pressure, sneezing, sore throat, tinnitus, trouble swallowing and voice change. Eyes: Negative for visual disturbance. Respiratory: Negative for apnea, cough, choking, chest tightness, shortness of breath, wheezing and stridor. Cardiovascular: Negative for chest pain, palpitations and leg swelling. Gastrointestinal: Negative for abdominal pain, diarrhea, nausea and vomiting. Endocrine: Negative for cold intolerance, heat intolerance, polydipsia and polyuria. Genitourinary: Negative for difficulty urinating, discharge, dysuria, enuresis, hematuria, penile pain, penile swelling, scrotal swelling, testicular pain and urgency. Musculoskeletal: Negative for arthralgias, gait problem, neck pain and neck stiffness. Skin: Negative for color change, rash and wound.    Allergic/Immunologic: Negative for environmental allergies, food allergies and immunocompromised state. Neurological: Negative for dizziness, seizures, syncope, facial asymmetry, speech difficulty, light-headedness and headaches. Hematological: Negative for adenopathy. Does not bruise/bleed easily. Psychiatric/Behavioral: Negative for confusion, sleep disturbance and suicidal ideas. The patient is not nervous/anxious.          Past Medical History:  Past Medical History        Past Medical History:   Diagnosis Date    Eczema             Social History:    TOBACCO:   reports that he has been smoking. He has never used smokeless tobacco.  ETOH:   reports current alcohol use. DRUGS:   reports no history of drug use.     Family History:   Family History       Problem Relation Age of Onset    Psoriasis Father             Surgical History:  Past Surgical History   History reviewed. No pertinent surgical history.         Objective:      This is a 21 y.o. male. Patient is alert and oriented to person, place and time. Patient appears well developed, well nourished. Mood is happy with normal affect. Not obviously hearing impaired. No abnormality in speech noted.     /86 (Site: Right Upper Arm, Position: Sitting)   Pulse 72   Temp 98.5 °F (36.9 °C) (Infrared)   Resp 16   Wt 171 lb 12.8 oz (77.9 kg)   BMI 23.96 kg/m²      Head:              Normocephalic, atraumatic. No obvious masses or lesions noted.     Ears:  External ears: Normal: no scars, lesions or masses. Mastoid process: No erythema noted. No tenderness to palpation. R External auditory canal: clear and free of any pathology  L External auditory canal: clear and free of any pathology   Tympanic membranes:  R intact, translucent                                                        L intact, translucent  Nose:               External nose: Appears midline. No obvious deformity or masses. Septum:  normal. No septal hematoma. No perforation.   Mucosa:  clear  Turbinates: normal and pink            Discharge:  none     Mouth/Throat:  Lips, tongue and oral cavity: Normal. No masses or lesions noted   Dentition: good, no malocclusion  Oral mucosa: moist  Tonsils: 3+ bilaterally with right slightly larger than the left. They are cryptic and chronic in appearance. No significant erythema, no exudates noted. Oropharynx: normal-appearing mucosa  Hard and soft palates: symmetrical and intact. Salivary glands: not enlarged and no tenderness to palpation. Uvula: midline, no obvious lesions              Gag reflex is present.     Neck: Trachea midline. Thyroid not enlarged, no palpable masses or tenderness. Lymphatic: No cervical lymphadenopathy noted. Eyes: KERRY, EOM intact. Conjunctiva moist without discharge. Lungs: Normal effort of breathing, not obviously distressed. Neuro: Cranial nerves II-XII grossly intact. Extremities: No clubbing, edema, or cyanosis noted.     Assessment/Plan:      Diagnosis Orders   1. Acute recurrent streptococcal tonsillitis      2. Chronic tonsillitis      3. Tonsillar hypertrophy, unilateral      4. Tonsillar abscess           The patient is a 21 y.o. male that presents for preop examination. I explained the typical day of surgery, postop course, postop follow-up with the patient. I discussed that with the patient that he would need to be off work while taking the narcotic for pain management which will likely be the first week postoperatively. 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 Also discussed avoiding garlic and NSAIDs 1 week preoperatively. The patient will get his CBC and COVID tests completed after her visit reportedly. Patient denies any other concerns at this time.   He will follow-up for surgery, but will contact the office before surgery with further concerns/questions.     Electronically signed by KIKI Rascon on 9/22/2020 at 11:30 AM

## 2020-09-27 ENCOUNTER — ANESTHESIA EVENT (OUTPATIENT)
Dept: OPERATING ROOM | Age: 20
End: 2020-09-27
Payer: COMMERCIAL

## 2020-09-28 ENCOUNTER — TELEPHONE (OUTPATIENT)
Dept: ENT CLINIC | Age: 20
End: 2020-09-28

## 2020-09-28 ENCOUNTER — HOSPITAL ENCOUNTER (OUTPATIENT)
Age: 20
Setting detail: OUTPATIENT SURGERY
Discharge: HOME | End: 2020-09-28
Attending: OTOLARYNGOLOGY | Admitting: OTOLARYNGOLOGY
Payer: COMMERCIAL

## 2020-09-28 ENCOUNTER — ANESTHESIA (OUTPATIENT)
Dept: OPERATING ROOM | Age: 20
End: 2020-09-28
Payer: COMMERCIAL

## 2020-09-28 VITALS
SYSTOLIC BLOOD PRESSURE: 144 MMHG | OXYGEN SATURATION: 94 % | TEMPERATURE: 97.1 F | HEART RATE: 101 BPM | DIASTOLIC BLOOD PRESSURE: 95 MMHG | WEIGHT: 166.4 LBS | HEIGHT: 71 IN | BODY MASS INDEX: 23.3 KG/M2 | RESPIRATION RATE: 17 BRPM

## 2020-09-28 VITALS
DIASTOLIC BLOOD PRESSURE: 42 MMHG | SYSTOLIC BLOOD PRESSURE: 86 MMHG | TEMPERATURE: 96.8 F | RESPIRATION RATE: 1 BRPM | OXYGEN SATURATION: 100 %

## 2020-09-28 PROBLEM — J35.03 CHRONIC ADENOTONSILLITIS: Status: ACTIVE | Noted: 2020-09-28

## 2020-09-28 PROCEDURE — 3600000004 HC SURGERY LEVEL 4 BASE: Performed by: OTOLARYNGOLOGY

## 2020-09-28 PROCEDURE — 6370000000 HC RX 637 (ALT 250 FOR IP): Performed by: OTOLARYNGOLOGY

## 2020-09-28 PROCEDURE — 7100000001 HC PACU RECOVERY - ADDTL 15 MIN: Performed by: OTOLARYNGOLOGY

## 2020-09-28 PROCEDURE — 3600000014 HC SURGERY LEVEL 4 ADDTL 15MIN: Performed by: OTOLARYNGOLOGY

## 2020-09-28 PROCEDURE — 2500000003 HC RX 250 WO HCPCS: Performed by: NURSE ANESTHETIST, CERTIFIED REGISTERED

## 2020-09-28 PROCEDURE — 2580000003 HC RX 258: Performed by: NURSE ANESTHETIST, CERTIFIED REGISTERED

## 2020-09-28 PROCEDURE — 3700000000 HC ANESTHESIA ATTENDED CARE: Performed by: OTOLARYNGOLOGY

## 2020-09-28 PROCEDURE — 42821 REMOVE TONSILS AND ADENOIDS: CPT | Performed by: OTOLARYNGOLOGY

## 2020-09-28 PROCEDURE — 2720000010 HC SURG SUPPLY STERILE: Performed by: OTOLARYNGOLOGY

## 2020-09-28 PROCEDURE — 6360000002 HC RX W HCPCS: Performed by: NURSE ANESTHETIST, CERTIFIED REGISTERED

## 2020-09-28 PROCEDURE — 7100000000 HC PACU RECOVERY - FIRST 15 MIN: Performed by: OTOLARYNGOLOGY

## 2020-09-28 PROCEDURE — 7100000011 HC PHASE II RECOVERY - ADDTL 15 MIN: Performed by: OTOLARYNGOLOGY

## 2020-09-28 PROCEDURE — 7100000010 HC PHASE II RECOVERY - FIRST 15 MIN: Performed by: OTOLARYNGOLOGY

## 2020-09-28 PROCEDURE — 6360000002 HC RX W HCPCS: Performed by: OTOLARYNGOLOGY

## 2020-09-28 PROCEDURE — 3700000001 HC ADD 15 MINUTES (ANESTHESIA): Performed by: OTOLARYNGOLOGY

## 2020-09-28 PROCEDURE — 88304 TISSUE EXAM BY PATHOLOGIST: CPT

## 2020-09-28 PROCEDURE — 2709999900 HC NON-CHARGEABLE SUPPLY: Performed by: OTOLARYNGOLOGY

## 2020-09-28 RX ORDER — HYDROXYZINE HCL 10 MG/5 ML
10 SOLUTION, ORAL ORAL EVERY 4 HOURS PRN
Status: DISCONTINUED | OUTPATIENT
Start: 2020-09-28 | End: 2020-09-28 | Stop reason: HOSPADM

## 2020-09-28 RX ORDER — MIDAZOLAM HYDROCHLORIDE 1 MG/ML
INJECTION INTRAMUSCULAR; INTRAVENOUS PRN
Status: DISCONTINUED | OUTPATIENT
Start: 2020-09-28 | End: 2020-09-28 | Stop reason: SDUPTHER

## 2020-09-28 RX ORDER — OXYMETAZOLINE HYDROCHLORIDE 0.05 G/100ML
SPRAY NASAL PRN
Status: DISCONTINUED | OUTPATIENT
Start: 2020-09-28 | End: 2020-09-28 | Stop reason: ALTCHOICE

## 2020-09-28 RX ORDER — AMOXICILLIN 400 MG/5ML
1000 POWDER, FOR SUSPENSION ORAL 2 TIMES DAILY
Qty: 250 ML | Refills: 0 | Status: SHIPPED | OUTPATIENT
Start: 2020-09-28 | End: 2020-09-28

## 2020-09-28 RX ORDER — FENTANYL CITRATE 50 UG/ML
INJECTION, SOLUTION INTRAMUSCULAR; INTRAVENOUS PRN
Status: DISCONTINUED | OUTPATIENT
Start: 2020-09-28 | End: 2020-09-28 | Stop reason: SDUPTHER

## 2020-09-28 RX ORDER — AMOXICILLIN 400 MG/5ML
POWDER, FOR SUSPENSION ORAL
Qty: 250 ML | Refills: 0 | Status: SHIPPED | OUTPATIENT
Start: 2020-09-28 | End: 2020-10-15 | Stop reason: ALTCHOICE

## 2020-09-28 RX ORDER — PROPOFOL 10 MG/ML
INJECTION, EMULSION INTRAVENOUS PRN
Status: DISCONTINUED | OUTPATIENT
Start: 2020-09-28 | End: 2020-09-28 | Stop reason: SDUPTHER

## 2020-09-28 RX ORDER — ONDANSETRON 2 MG/ML
INJECTION INTRAMUSCULAR; INTRAVENOUS PRN
Status: DISCONTINUED | OUTPATIENT
Start: 2020-09-28 | End: 2020-09-28 | Stop reason: SDUPTHER

## 2020-09-28 RX ORDER — SUCCINYLCHOLINE/SOD CL,ISO/PF 200MG/10ML
SYRINGE (ML) INTRAVENOUS PRN
Status: DISCONTINUED | OUTPATIENT
Start: 2020-09-28 | End: 2020-09-28 | Stop reason: SDUPTHER

## 2020-09-28 RX ORDER — ROPIVACAINE HYDROCHLORIDE 5 MG/ML
INJECTION, SOLUTION EPIDURAL; INFILTRATION; PERINEURAL PRN
Status: DISCONTINUED | OUTPATIENT
Start: 2020-09-28 | End: 2020-09-28 | Stop reason: ALTCHOICE

## 2020-09-28 RX ORDER — LIDOCAINE HYDROCHLORIDE 20 MG/ML
INJECTION, SOLUTION EPIDURAL; INFILTRATION; INTRACAUDAL; PERINEURAL PRN
Status: DISCONTINUED | OUTPATIENT
Start: 2020-09-28 | End: 2020-09-28 | Stop reason: SDUPTHER

## 2020-09-28 RX ORDER — SODIUM CHLORIDE 9 MG/ML
INJECTION, SOLUTION INTRAVENOUS CONTINUOUS PRN
Status: DISCONTINUED | OUTPATIENT
Start: 2020-09-28 | End: 2020-09-28 | Stop reason: SDUPTHER

## 2020-09-28 RX ORDER — ROCURONIUM BROMIDE 10 MG/ML
INJECTION, SOLUTION INTRAVENOUS PRN
Status: DISCONTINUED | OUTPATIENT
Start: 2020-09-28 | End: 2020-09-28 | Stop reason: SDUPTHER

## 2020-09-28 RX ORDER — HYDROXYZINE HCL 10 MG/5 ML
10 SOLUTION, ORAL ORAL EVERY 4 HOURS PRN
Qty: 210 ML | Refills: 0 | Status: SHIPPED | OUTPATIENT
Start: 2020-09-28 | End: 2020-09-28

## 2020-09-28 RX ORDER — PROMETHAZINE HYDROCHLORIDE 25 MG/ML
12.5 INJECTION, SOLUTION INTRAMUSCULAR; INTRAVENOUS
Status: DISCONTINUED | OUTPATIENT
Start: 2020-09-28 | End: 2020-09-28 | Stop reason: HOSPADM

## 2020-09-28 RX ORDER — FENTANYL CITRATE 50 UG/ML
50 INJECTION, SOLUTION INTRAMUSCULAR; INTRAVENOUS EVERY 5 MIN PRN
Status: DISCONTINUED | OUTPATIENT
Start: 2020-09-28 | End: 2020-09-28 | Stop reason: HOSPADM

## 2020-09-28 RX ORDER — FENTANYL CITRATE 50 UG/ML
25 INJECTION, SOLUTION INTRAMUSCULAR; INTRAVENOUS EVERY 5 MIN PRN
Status: DISCONTINUED | OUTPATIENT
Start: 2020-09-28 | End: 2020-09-28 | Stop reason: HOSPADM

## 2020-09-28 RX ORDER — LABETALOL 20 MG/4 ML (5 MG/ML) INTRAVENOUS SYRINGE
10 EVERY 10 MIN PRN
Status: DISCONTINUED | OUTPATIENT
Start: 2020-09-28 | End: 2020-09-28 | Stop reason: HOSPADM

## 2020-09-28 RX ORDER — HYDROXYZINE HCL 10 MG/5 ML
10 SOLUTION, ORAL ORAL EVERY 4 HOURS PRN
Qty: 210 ML | Refills: 0 | Status: SHIPPED | OUTPATIENT
Start: 2020-09-28 | End: 2020-10-15 | Stop reason: ALTCHOICE

## 2020-09-28 RX ADMIN — FENTANYL CITRATE 25 MCG: 50 INJECTION, SOLUTION INTRAMUSCULAR; INTRAVENOUS at 10:33

## 2020-09-28 RX ADMIN — FENTANYL CITRATE 25 MCG: 50 INJECTION, SOLUTION INTRAMUSCULAR; INTRAVENOUS at 10:41

## 2020-09-28 RX ADMIN — FENTANYL CITRATE 25 MCG: 50 INJECTION, SOLUTION INTRAMUSCULAR; INTRAVENOUS at 10:30

## 2020-09-28 RX ADMIN — FENTANYL CITRATE 25 MCG: 50 INJECTION, SOLUTION INTRAMUSCULAR; INTRAVENOUS at 10:09

## 2020-09-28 RX ADMIN — FENTANYL CITRATE 100 MCG: 50 INJECTION, SOLUTION INTRAMUSCULAR; INTRAVENOUS at 09:51

## 2020-09-28 RX ADMIN — Medication 10 MG: at 12:02

## 2020-09-28 RX ADMIN — FENTANYL CITRATE 25 MCG: 50 INJECTION, SOLUTION INTRAMUSCULAR; INTRAVENOUS at 10:13

## 2020-09-28 RX ADMIN — MIDAZOLAM HYDROCHLORIDE 2 MG: 1 INJECTION, SOLUTION INTRAMUSCULAR; INTRAVENOUS at 09:51

## 2020-09-28 RX ADMIN — ONDANSETRON HYDROCHLORIDE 4 MG: 4 INJECTION, SOLUTION INTRAMUSCULAR; INTRAVENOUS at 09:51

## 2020-09-28 RX ADMIN — SODIUM CHLORIDE: 9 INJECTION, SOLUTION INTRAVENOUS at 09:48

## 2020-09-28 RX ADMIN — LIDOCAINE HYDROCHLORIDE 40 MG: 20 INJECTION, SOLUTION EPIDURAL; INFILTRATION; INTRACAUDAL; PERINEURAL at 09:51

## 2020-09-28 RX ADMIN — SODIUM CHLORIDE: 9 INJECTION, SOLUTION INTRAVENOUS at 10:23

## 2020-09-28 RX ADMIN — FENTANYL CITRATE 25 MCG: 50 INJECTION, SOLUTION INTRAMUSCULAR; INTRAVENOUS at 10:05

## 2020-09-28 RX ADMIN — ROCURONIUM BROMIDE 10 MG: 10 INJECTION INTRAVENOUS at 09:51

## 2020-09-28 RX ADMIN — FENTANYL CITRATE 25 MCG: 50 INJECTION, SOLUTION INTRAMUSCULAR; INTRAVENOUS at 10:25

## 2020-09-28 RX ADMIN — PROPOFOL 200 MG: 10 INJECTION, EMULSION INTRAVENOUS at 09:51

## 2020-09-28 RX ADMIN — FENTANYL CITRATE 25 MCG: 50 INJECTION, SOLUTION INTRAMUSCULAR; INTRAVENOUS at 10:02

## 2020-09-28 RX ADMIN — Medication 10 ML: at 12:02

## 2020-09-28 RX ADMIN — Medication 120 MG: at 09:51

## 2020-09-28 ASSESSMENT — PULMONARY FUNCTION TESTS
PIF_VALUE: 2
PIF_VALUE: 14
PIF_VALUE: 16
PIF_VALUE: 15
PIF_VALUE: 16
PIF_VALUE: 7
PIF_VALUE: 2
PIF_VALUE: 14
PIF_VALUE: 8
PIF_VALUE: 20
PIF_VALUE: 16
PIF_VALUE: 17
PIF_VALUE: 17
PIF_VALUE: 16
PIF_VALUE: 0
PIF_VALUE: 15
PIF_VALUE: 16
PIF_VALUE: 20
PIF_VALUE: 7
PIF_VALUE: 16
PIF_VALUE: 4
PIF_VALUE: 16
PIF_VALUE: 15
PIF_VALUE: 0
PIF_VALUE: 16
PIF_VALUE: 17
PIF_VALUE: 18
PIF_VALUE: 28
PIF_VALUE: 23
PIF_VALUE: 16
PIF_VALUE: 0
PIF_VALUE: 1
PIF_VALUE: 15
PIF_VALUE: 17
PIF_VALUE: 15
PIF_VALUE: 1
PIF_VALUE: 15
PIF_VALUE: 16
PIF_VALUE: 16
PIF_VALUE: 26
PIF_VALUE: 19
PIF_VALUE: 16
PIF_VALUE: 15
PIF_VALUE: 16
PIF_VALUE: 29
PIF_VALUE: 16
PIF_VALUE: 19
PIF_VALUE: 26
PIF_VALUE: 16
PIF_VALUE: 15
PIF_VALUE: 16
PIF_VALUE: 1
PIF_VALUE: 18
PIF_VALUE: 16
PIF_VALUE: 16
PIF_VALUE: 17
PIF_VALUE: 16
PIF_VALUE: 14
PIF_VALUE: 16
PIF_VALUE: 17
PIF_VALUE: 2
PIF_VALUE: 1
PIF_VALUE: 16
PIF_VALUE: 16
PIF_VALUE: 0
PIF_VALUE: 19
PIF_VALUE: 14
PIF_VALUE: 14
PIF_VALUE: 16
PIF_VALUE: 16
PIF_VALUE: 15
PIF_VALUE: 16
PIF_VALUE: 16
PIF_VALUE: 20
PIF_VALUE: 16

## 2020-09-28 ASSESSMENT — PAIN SCALES - GENERAL: PAINLEVEL_OUTOF10: 8

## 2020-09-28 NOTE — PROGRESS NOTES
1117 To recovery via cart. Spont resp. VSS. O2 sat 90% on 10L O2 per mask. IV infusing KVO. SCDs on bilat. Pt not arousable. Report received from surgical rn and CRNA. Warm blankets applied. 1120 Condition unchanged. 1125 Remains unresponsive. Resp regular. O2 switched to 2L per N/C. Dr Jennifer Laguerre in to see pt. Continue with current POC. 1130 Pt responding to name and tactile stimuli. 1135 Pt does not rate pain but states discomfort. Ice applied to throat. No bleeding visible in posterior oral area. 1140 Warm blankets applied. [phone] Dr Jennifer Laguerre in to see pt. Report blood pressure readings to DR Humphries. No new orders at this time  1150 Denies nausea. Water PO. Rated throat pain 8/10.   1155 Condition remains stable. Meets criteria for transfer to phase II recovery care. Transfer to phase II recovery care via cart. Warming device applied. Ice remains on throat. Mother at bedside. Call bell in reach  1202 Oral pain med given as ordered. See MAR  1220 Denies needs. 1245 Rates pain 4/10.   1300 Condition remains unchanged. Pt resting quietly. 1315 Snack and drink given  1335 Denies nausea.   1345 Up to dress self with mother assist  3423 9030 IV discontinued  1400 Discharge instructions given to pt and mother with each voicing understanding  [phone] Discharge to home per wheelchair in stable condition with mother

## 2020-09-28 NOTE — ANESTHESIA POSTPROCEDURE EVALUATION
Department of Anesthesiology  Postprocedure Note    Patient: Jose Paredes  MRN: [id number]  YOB: 2000  Date of evaluation: 9/28/2020  Time:  12:34 PM     Procedure Summary     Date:  09/28/20 Room / Location:  [address] [place] [address] 01 / 138 [facility]    Anesthesia Start:  1868 Anesthesia Stop:  6845    Procedure:  TONSILLECTOMY ADENOIDECTOMY (N/A ) Diagnosis:  (STREPTOCOCEAL TONSILLITIS, TONSILLAR ABSCESS, TONSILLAR HYTPERTROPHY)    Surgeon:  Dara Li MD Responsible Provider:  Jeffrey Don DO    Anesthesia Type:  general ASA Status:  1          Anesthesia Type: general    Jonathan Phase I: Jonathan Score: 9    Jonathan Phase II:      Last vitals: Reviewed and per EMR flowsheets.        Anesthesia Post Evaluation    Patient location during evaluation: PACU  Patient participation: complete - patient participated  Level of consciousness: awake  Airway patency: patent  Nausea & Vomiting: no vomiting and no nausea  Cardiovascular status: hemodynamically stable  Respiratory status: acceptable  Hydration status: stable

## 2020-09-28 NOTE — OP NOTE
further bleeding. The patient was then awakened, extubated, and  taken to recovery room in satisfactory condition. There were no  complications. Hima Gleason M.D.    D: 09/28/2020 11:42:04       T: 09/28/2020 11:45:49     [email or web address]  Job#: [id number]     Doc#: [id number]    CC:  Ludy Hudson.  Oscar Adame M.D.

## 2020-09-28 NOTE — TELEPHONE ENCOUNTER
Patient's mom called and stated patient has T & A done today and would like medications be sent to [facility] instead of [facility]. Please switch.

## 2020-09-28 NOTE — ANESTHESIA PRE PROCEDURE
Department of Anesthesiology  Preprocedure Note       Name:  Berny Saenz   Age:  21 y.o.  :  2000                                          MRN:  [id number]         Date:  2020      Surgeon: Tere Alarcon):  Annika Santo MD    Procedure: Procedure(s):  TONSILLECTOMY ADENOIDECTOMY    Medications prior to admission:   Prior to Admission medications    Not on File       Current medications:    No current facility-administered medications for this encounter. Allergies:  No Known Allergies    Problem List:    Patient Active Problem List   Diagnosis Code    Tonsillar abscess J36    Acute recurrent streptococcal tonsillitis J03.01    Atypical lymphocytes present on peripheral blood smear R88.8       Past Medical History:        Diagnosis Date    Eczema        Past Surgical History:  History reviewed. No pertinent surgical history.     Social History:    Social History     Tobacco Use    Smoking status: Never Smoker    Smokeless tobacco: Never Used   Substance Use Topics    Alcohol use: Yes     Comment: social                                Counseling given: Not Answered      Vital Signs (Current):   Vitals:    20 1556 20 0815   BP:  (!) 140/89   Pulse:  87   Resp:  16   Temp:  96.8 °F (36 °C)   TempSrc:  Infrared   SpO2:  98%   Weight: 150 lb (68 kg) 166 lb 6.4 oz (75.5 kg)   Height: 5' 11\" (1.803 m) 5' 11\" (1.803 m)                                              BP Readings from Last 3 Encounters:   20 (!) 140/89   20 130/86   20 110/68       NPO Status: Time of last liquid consumption: 0715(sips of water)                        Time of last solid consumption: 1900                        Date of last liquid consumption: 20                        Date of last solid food consumption: 20    BMI:   Wt Readings from Last 3 Encounters:   20 166 lb 6.4 oz (75.5 kg)   20 171 lb 12.8 oz (77.9 kg)   20 166 lb 8 oz (75.5 kg) (66 %, Z= 0.42)*     * Mehnaz Humphries,    9/28/2020

## 2020-09-28 NOTE — BRIEF OP NOTE
Brief Postoperative Note      Patient: Robert Leija  YOB: 2000  MRN: [id number]    Date of Procedure: 9/28/2020    Pre-Op Diagnosis: STREPTOCOCEAL TONSILLITIS, TONSILLAR ABSCESS, TONSILLAR HYTPERTROPHY    Post-Op Diagnosis: Same plus chronic adenotonsillitis       Procedure(s):  TONSILLECTOMY ADENOIDECTOMY    Surgeon(s):  Daxa Martell MD    Assistant:  * No surgical staff found *    Anesthesia: General    Estimated Blood Loss (mL): less than 50     Complications: None    Specimens:   ID Type Source Tests Collected by Time Destination   A : left tonsil Tissue Tonsil SURGICAL PATHOLOGY Daxa Martell MD 9/28/2020 1011    B : right tonsil Tissue Tonsil SURGICAL PATHOLOGY Daxa Martell MD 9/28/2020 1011        Implants:  * No implants in log *      Drains: * No LDAs found *    Findings: Cryptic tonsils and adenoids.   Tonsils were 3+ with tonsiliths and very scarred to tonsil beds    Electronically signed by Abiel Montana MD on 9/28/2020 at 11:32 AM

## 2020-09-28 NOTE — TELEPHONE ENCOUNTER
Occasion sent to [facility]. I called the [facility] and verified Dr. Brendan Hickman prescriptions were canceled.

## 2020-09-29 NOTE — TELEPHONE ENCOUNTER
[facility] with [facility] called and stated that they only dispensed 315 ml of hydrocodone-acetaminophen because they only had that much in stock. London also stated that the rest was voided.

## 2020-09-30 ENCOUNTER — TELEPHONE (OUTPATIENT)
Dept: FAMILY MEDICINE CLINIC | Age: 20
End: 2020-09-30

## 2020-09-30 NOTE — LETTER
[facility]  [address] [facility], [address]  Phone: [phone]  Fax: [phone]    September 30, 2020    [address] [address]    Dear Eneida Mendoza,    Thank you for choosing [address] [address] on 9/28/20. Dr. Estela Lea wanted to make sure that you understand your discharge instructions and that you were able to fill any prescriptions that may have been ordered for you. Please contact the office at the above phone number if you were advised to follow up with your Dr. Estela Lea, or if you have any further questions or needs. Also, did you know [address] [facility] [address] practices can often offer you an appointment on the same day that you call for acute issues. *We have some [facility] offices that offer Walk-in appointments; check our website for availability in your community, [email or web address].    *Evisits are now available for patients through [address] If you do not have MyChart and are interested, please contact the office and a staff member may assist you or go to [email or web address].     Sincerely,  Estela Lea MD and [facility]

## 2020-10-15 ENCOUNTER — OFFICE VISIT (OUTPATIENT)
Dept: ENT CLINIC | Age: 20
End: 2020-10-15

## 2020-10-15 VITALS
DIASTOLIC BLOOD PRESSURE: 88 MMHG | BODY MASS INDEX: 23.24 KG/M2 | SYSTOLIC BLOOD PRESSURE: 132 MMHG | HEART RATE: 78 BPM | RESPIRATION RATE: 14 BRPM | WEIGHT: 166 LBS | HEIGHT: 71 IN | TEMPERATURE: 98.9 F

## 2020-10-15 PROCEDURE — 99024 POSTOP FOLLOW-UP VISIT: CPT | Performed by: PHYSICIAN ASSISTANT

## 2020-10-15 ASSESSMENT — ENCOUNTER SYMPTOMS
SHORTNESS OF BREATH: 0
SORE THROAT: 0
ABDOMINAL PAIN: 0
NAUSEA: 0
STRIDOR: 0
VOMITING: 0
CHEST TIGHTNESS: 0
CHOKING: 0
SINUS PRESSURE: 0
APNEA: 0
FACIAL SWELLING: 0
COLOR CHANGE: 0
COUGH: 0
DIARRHEA: 0
TROUBLE SWALLOWING: 0
VOICE CHANGE: 0
RHINORRHEA: 0
WHEEZING: 0

## 2020-10-15 NOTE — PROGRESS NOTES
[facility] PROFESSIONAL [facility] EAR, NOSE AND THROAT  Mig[address] Wong [address] [facility]  Dept: [phone]  Dept Fax: [phone]  Loc: [phone]    Karen Valle is a 21 y.o. male who was referred by No ref. provider found for:  Chief Complaint   Patient presents with    Post-Op Check     Patient is here post-op for T & A 09/28/2020. [place] HPI:     The patient presents for postop examination. The patient underwent colectomy and adenoidectomy with Dr. Juice Sales on 9/28/2020. The patient reports that he is doing well since surgery. He states that he is drinking and eating normally at this time. No reported episodes of bleeding. He states that he will occasionally get a brief episode of throat pain for a few seconds and will spontaneously resolve. I discussed the pathology report with the patient. He does admit that he used to notice small white/yellow granules in his throat that he would spit up. He states that he thought that these were cancer and was afraid to be evaluated for it previously. He denies any other concerns at this time. Subjective:        Review of Systems   Constitutional: Negative for activity change, appetite change, chills, diaphoresis, fatigue, fever and unexpected weight change. HENT: Negative for congestion, dental problem, ear discharge, ear pain, facial swelling, hearing loss, mouth sores, nosebleeds, postnasal drip, rhinorrhea, sinus pressure, sneezing, sore throat, tinnitus, trouble swallowing and voice change. Eyes: Negative for visual disturbance. Respiratory: Negative for apnea, cough, choking, chest tightness, shortness of breath, wheezing and stridor. Cardiovascular: Negative for chest pain, palpitations and leg swelling. Gastrointestinal: Negative for abdominal pain, diarrhea, nausea and vomiting. Endocrine: Negative for cold intolerance, heat intolerance, polydipsia and polyuria.    Genitourinary: Negative for difficulty urinating, discharge, dysuria, enuresis, hematuria, penile pain, penile swelling, scrotal swelling, testicular pain and urgency. Musculoskeletal: Negative for arthralgias, gait problem, neck pain and neck stiffness. Skin: Negative for color change, rash and wound. Allergic/Immunologic: Negative for environmental allergies, food allergies and immunocompromised state. Neurological: Negative for dizziness, seizures, syncope, facial asymmetry, speech difficulty, light-headedness and headaches. Hematological: Negative for adenopathy. Does not bruise/bleed easily. Psychiatric/Behavioral: Negative for confusion, sleep disturbance and suicidal ideas. The patient is not nervous/anxious. Past Medical History:  Past Medical History:   Diagnosis Date    Eczema        Social History:    TOBACCO:   reports that he has never smoked. He has never used smokeless tobacco.  ETOH:   reports current alcohol use. DRUGS:   reports no history of drug use. Family History:       Problem Relation Age of Onset    Psoriasis Father        Surgical History:  Past Surgical History:   Procedure Laterality Date    TONSILLECTOMY AND ADENOIDECTOMY N/A 9/28/2020    TONSILLECTOMY ADENOIDECTOMY performed by Eric Pantoja MD at [facility] OR        Objective: This is a 21 y.o. male. Patient is alert and oriented to person, place and time. Patient appears well developed, well nourished. Mood is happy with normal affect. Not obviously hearing impaired. No abnormality in speech noted. /88 (Site: Left Upper Arm, Position: Sitting)   Pulse 78   Temp 98.9 °F (37.2 °C) (Infrared)   Resp 14   Ht 5' 11\" (1.803 m)   Wt 166 lb (75.3 kg)   BMI 23.15 kg/m²     Head:   Normocephalic, atraumatic. No obvious masses or lesions noted. Nose:    External nose: Appears midline. No obvious deformity or masses. Septum:  normal. No septal hematoma. No perforation.   Mucosa:  clear  Turbinates: normal and pink and adenoidectomy)     2. Postoperative examination         The patient is a 21 y.o. male that presents for postop examination. Patient reports that he is doing well and is overall happy with the results of surgery. The patient has resumed a normal diet and is tolerating work without any issues. Explained to the patient that his intermittent throat pain should gradually resolve over the next 3 weeks. I recommended he contact the office with persistent pain, bleeding, or any other concerns. The patient expresses understanding the plan and thanked me. He will contact the office with further questions/concerns.   Follow-up as needed    Electronically signed by KIKI Toscano on 10/15/2020 at 3:03 PM

## (undated) DEVICE — PACK,SET UP,NO DRAPES: Brand: MEDLINE

## (undated) DEVICE — GAUZE,SPONGE,8"X4",12PLY,XRAY,STRL,LF: Brand: MEDLINE

## (undated) DEVICE — PENCIL ES L3M BTTN SWCH HOLSTER W/ BLDE ELECTRD EDGE

## (undated) DEVICE — GLOVE ORANGE PI 7   MSG9070

## (undated) DEVICE — SURE SET SINGLE BASIN-LF: Brand: MEDLINE INDUSTRIES, INC.

## (undated) DEVICE — GLOVE SURG SZ 75 L12IN FNGR THK94MIL WHT POLYMER LTX BEAD

## (undated) DEVICE — TUBING, SUCTION, 1/4" X 12', STRAIGHT: Brand: MEDLINE

## (undated) DEVICE — SYRINGE MED 10ML TRNSLUC BRL PLUNG BLK MRK POLYPR CTRL

## (undated) DEVICE — GOWN,SIRUS,NON REINFRCD,LARGE,SET IN SL: Brand: MEDLINE

## (undated) DEVICE — SOLUTION IV 1000ML 0.9% SOD CHL PH 5 INJ USP VIAFLX PLAS

## (undated) DEVICE — SUCTION COAGULATOR, FOOTSWITCHING, 10 FR, 6 INCH (15.24CM): Brand: MEGADYNE

## (undated) DEVICE — NEEDLE SPNL L3.5IN DIA25GA QNCKE TYP BVL SPINOCAN

## (undated) DEVICE — PROCISE MAX COBLATION WAND: Brand: COBLATION

## (undated) DEVICE — SPONGE,TONSIL,DBL STRNG,XRAY,SM,7/8",ST: Brand: MEDLINE INDUSTRIES, INC.

## (undated) DEVICE — DEVICE TNSLCTMY OPN SEAL DIV BIZACT

## (undated) DEVICE — BLADE ES L4IN INSUL EDGE

## (undated) DEVICE — GLOVE SURG SZ 65 THK91MIL LTX FREE SYN POLYISOPRENE

## (undated) DEVICE — DUAL LUMEN STOMACH TUBE: Brand: SALEM SUMP

## (undated) DEVICE — CATHETER,URETHRAL,REDRUBBER,STRL,10FR: Brand: MEDLINE INDUSTRIES, INC.